# Patient Record
Sex: FEMALE | Race: WHITE | NOT HISPANIC OR LATINO | Employment: UNEMPLOYED | URBAN - METROPOLITAN AREA
[De-identification: names, ages, dates, MRNs, and addresses within clinical notes are randomized per-mention and may not be internally consistent; named-entity substitution may affect disease eponyms.]

---

## 2024-02-02 ENCOUNTER — APPOINTMENT (EMERGENCY)
Dept: CT IMAGING | Facility: HOSPITAL | Age: 49
End: 2024-02-02
Payer: COMMERCIAL

## 2024-02-02 ENCOUNTER — HOSPITAL ENCOUNTER (OUTPATIENT)
Facility: HOSPITAL | Age: 49
Setting detail: OBSERVATION
Discharge: HOME | End: 2024-02-04
Attending: EMERGENCY MEDICINE | Admitting: INTERNAL MEDICINE
Payer: COMMERCIAL

## 2024-02-02 DIAGNOSIS — R10.13 EPIGASTRIC PAIN: ICD-10-CM

## 2024-02-02 DIAGNOSIS — K21.9 GERD (GASTROESOPHAGEAL REFLUX DISEASE): ICD-10-CM

## 2024-02-02 DIAGNOSIS — K80.20 CHOLELITHIASIS WITHOUT CHOLECYSTITIS: ICD-10-CM

## 2024-02-02 DIAGNOSIS — K81.0 ACUTE CHOLECYSTITIS: ICD-10-CM

## 2024-02-02 DIAGNOSIS — R93.5 ABNORMAL CT OF THE ABDOMEN: Primary | ICD-10-CM

## 2024-02-02 LAB
2HR DELTA HS TROPONIN: 1 NG/L
ALBUMIN SERPL BCP-MCNC: 4.4 G/DL (ref 3.5–5)
ALP SERPL-CCNC: 79 U/L (ref 34–104)
ALT SERPL W P-5'-P-CCNC: 16 U/L (ref 7–52)
ANION GAP SERPL CALCULATED.3IONS-SCNC: 8 MMOL/L
AST SERPL W P-5'-P-CCNC: 14 U/L (ref 13–39)
BACTERIA UR QL AUTO: ABNORMAL /HPF
BASOPHILS # BLD AUTO: 0.05 THOUSANDS/ÂΜL (ref 0–0.1)
BASOPHILS NFR BLD AUTO: 1 % (ref 0–1)
BILIRUB SERPL-MCNC: 0.28 MG/DL (ref 0.2–1)
BILIRUB UR QL STRIP: NEGATIVE
BUN SERPL-MCNC: 9 MG/DL (ref 5–25)
CALCIUM SERPL-MCNC: 9.5 MG/DL (ref 8.4–10.2)
CARDIAC TROPONIN I PNL SERPL HS: 4 NG/L
CARDIAC TROPONIN I PNL SERPL HS: 5 NG/L
CHLORIDE SERPL-SCNC: 103 MMOL/L (ref 96–108)
CLARITY UR: CLEAR
CO2 SERPL-SCNC: 23 MMOL/L (ref 21–32)
COLOR UR: ABNORMAL
CREAT SERPL-MCNC: 0.62 MG/DL (ref 0.6–1.3)
EOSINOPHIL # BLD AUTO: 0.17 THOUSAND/ÂΜL (ref 0–0.61)
EOSINOPHIL NFR BLD AUTO: 2 % (ref 0–6)
ERYTHROCYTE [DISTWIDTH] IN BLOOD BY AUTOMATED COUNT: 17.1 % (ref 11.6–15.1)
EXT PREGNANCY TEST URINE: NEGATIVE
EXT. CONTROL: NORMAL
GFR SERPL CREATININE-BSD FRML MDRD: 106 ML/MIN/1.73SQ M
GLUCOSE SERPL-MCNC: 127 MG/DL (ref 65–140)
GLUCOSE UR STRIP-MCNC: NEGATIVE MG/DL
HCT VFR BLD AUTO: 36.5 % (ref 34.8–46.1)
HGB BLD-MCNC: 11.3 G/DL (ref 11.5–15.4)
HGB UR QL STRIP.AUTO: NEGATIVE
IMM GRANULOCYTES # BLD AUTO: 0.05 THOUSAND/UL (ref 0–0.2)
IMM GRANULOCYTES NFR BLD AUTO: 1 % (ref 0–2)
KETONES UR STRIP-MCNC: ABNORMAL MG/DL
LEUKOCYTE ESTERASE UR QL STRIP: NEGATIVE
LIPASE SERPL-CCNC: 29 U/L (ref 11–82)
LYMPHOCYTES # BLD AUTO: 2.49 THOUSANDS/ÂΜL (ref 0.6–4.47)
LYMPHOCYTES NFR BLD AUTO: 23 % (ref 14–44)
MCH RBC QN AUTO: 23.9 PG (ref 26.8–34.3)
MCHC RBC AUTO-ENTMCNC: 31 G/DL (ref 31.4–37.4)
MCV RBC AUTO: 77 FL (ref 82–98)
MONOCYTES # BLD AUTO: 0.51 THOUSAND/ÂΜL (ref 0.17–1.22)
MONOCYTES NFR BLD AUTO: 5 % (ref 4–12)
MUCOUS THREADS UR QL AUTO: ABNORMAL
NEUTROPHILS # BLD AUTO: 7.45 THOUSANDS/ÂΜL (ref 1.85–7.62)
NEUTS SEG NFR BLD AUTO: 68 % (ref 43–75)
NITRITE UR QL STRIP: NEGATIVE
NON-SQ EPI CELLS URNS QL MICRO: ABNORMAL /HPF
NRBC BLD AUTO-RTO: 0 /100 WBCS
PH UR STRIP.AUTO: 7 [PH]
PLATELET # BLD AUTO: 348 THOUSANDS/UL (ref 149–390)
PMV BLD AUTO: 9.1 FL (ref 8.9–12.7)
POTASSIUM SERPL-SCNC: 3.7 MMOL/L (ref 3.5–5.3)
PROT SERPL-MCNC: 7.4 G/DL (ref 6.4–8.4)
PROT UR STRIP-MCNC: ABNORMAL MG/DL
RBC # BLD AUTO: 4.73 MILLION/UL (ref 3.81–5.12)
RBC #/AREA URNS AUTO: ABNORMAL /HPF
SODIUM SERPL-SCNC: 134 MMOL/L (ref 135–147)
SP GR UR STRIP.AUTO: 1.03 (ref 1–1.03)
UROBILINOGEN UR STRIP-ACNC: <2 MG/DL
WBC # BLD AUTO: 10.72 THOUSAND/UL (ref 4.31–10.16)
WBC #/AREA URNS AUTO: ABNORMAL /HPF

## 2024-02-02 PROCEDURE — 83690 ASSAY OF LIPASE: CPT | Performed by: EMERGENCY MEDICINE

## 2024-02-02 PROCEDURE — 96361 HYDRATE IV INFUSION ADD-ON: CPT

## 2024-02-02 PROCEDURE — 85025 COMPLETE CBC W/AUTO DIFF WBC: CPT | Performed by: EMERGENCY MEDICINE

## 2024-02-02 PROCEDURE — 81001 URINALYSIS AUTO W/SCOPE: CPT | Performed by: EMERGENCY MEDICINE

## 2024-02-02 PROCEDURE — G1004 CDSM NDSC: HCPCS

## 2024-02-02 PROCEDURE — 74177 CT ABD & PELVIS W/CONTRAST: CPT

## 2024-02-02 PROCEDURE — 99285 EMERGENCY DEPT VISIT HI MDM: CPT | Performed by: EMERGENCY MEDICINE

## 2024-02-02 PROCEDURE — 96375 TX/PRO/DX INJ NEW DRUG ADDON: CPT

## 2024-02-02 PROCEDURE — 36415 COLL VENOUS BLD VENIPUNCTURE: CPT

## 2024-02-02 PROCEDURE — 80053 COMPREHEN METABOLIC PANEL: CPT | Performed by: EMERGENCY MEDICINE

## 2024-02-02 PROCEDURE — 99284 EMERGENCY DEPT VISIT MOD MDM: CPT

## 2024-02-02 PROCEDURE — 96374 THER/PROPH/DIAG INJ IV PUSH: CPT

## 2024-02-02 PROCEDURE — 84484 ASSAY OF TROPONIN QUANT: CPT | Performed by: EMERGENCY MEDICINE

## 2024-02-02 PROCEDURE — 81025 URINE PREGNANCY TEST: CPT | Performed by: EMERGENCY MEDICINE

## 2024-02-02 PROCEDURE — 93005 ELECTROCARDIOGRAM TRACING: CPT

## 2024-02-02 RX ORDER — ONDANSETRON 2 MG/ML
4 INJECTION INTRAMUSCULAR; INTRAVENOUS ONCE
Status: COMPLETED | OUTPATIENT
Start: 2024-02-02 | End: 2024-02-02

## 2024-02-02 RX ORDER — SUCRALFATE 1 G/1
1 TABLET ORAL ONCE
Status: COMPLETED | OUTPATIENT
Start: 2024-02-02 | End: 2024-02-02

## 2024-02-02 RX ORDER — HYDROMORPHONE HCL/PF 1 MG/ML
0.5 SYRINGE (ML) INJECTION ONCE
Status: COMPLETED | OUTPATIENT
Start: 2024-02-02 | End: 2024-02-02

## 2024-02-02 RX ADMIN — HYDROMORPHONE HYDROCHLORIDE 0.5 MG: 1 INJECTION, SOLUTION INTRAMUSCULAR; INTRAVENOUS; SUBCUTANEOUS at 23:16

## 2024-02-02 RX ADMIN — IOHEXOL 100 ML: 350 INJECTION, SOLUTION INTRAVENOUS at 23:35

## 2024-02-02 RX ADMIN — SODIUM CHLORIDE 1000 ML: 0.9 INJECTION, SOLUTION INTRAVENOUS at 23:16

## 2024-02-02 RX ADMIN — SUCRALFATE 1 G: 1 TABLET ORAL at 23:16

## 2024-02-02 RX ADMIN — ONDANSETRON 4 MG: 2 INJECTION INTRAMUSCULAR; INTRAVENOUS at 23:14

## 2024-02-03 ENCOUNTER — ANESTHESIA EVENT (OUTPATIENT)
Dept: PERIOP | Facility: HOSPITAL | Age: 49
End: 2024-02-03
Payer: COMMERCIAL

## 2024-02-03 ENCOUNTER — APPOINTMENT (OUTPATIENT)
Dept: ULTRASOUND IMAGING | Facility: HOSPITAL | Age: 49
End: 2024-02-03
Payer: COMMERCIAL

## 2024-02-03 ENCOUNTER — ANESTHESIA (OUTPATIENT)
Dept: PERIOP | Facility: HOSPITAL | Age: 49
End: 2024-02-03
Payer: COMMERCIAL

## 2024-02-03 PROBLEM — R93.5 ABNORMAL CT OF THE ABDOMEN: Status: ACTIVE | Noted: 2024-02-03

## 2024-02-03 PROBLEM — K21.9 GERD (GASTROESOPHAGEAL REFLUX DISEASE): Chronic | Status: ACTIVE | Noted: 2024-02-03

## 2024-02-03 PROBLEM — R10.13 EPIGASTRIC PAIN: Status: ACTIVE | Noted: 2024-02-03

## 2024-02-03 LAB
ATRIAL RATE: 68 BPM
P AXIS: 48 DEGREES
PR INTERVAL: 140 MS
QRS AXIS: 67 DEGREES
QRSD INTERVAL: 70 MS
QT INTERVAL: 396 MS
QTC INTERVAL: 421 MS
T WAVE AXIS: -16 DEGREES
VENTRICULAR RATE: 68 BPM

## 2024-02-03 PROCEDURE — 99223 1ST HOSP IP/OBS HIGH 75: CPT | Performed by: INTERNAL MEDICINE

## 2024-02-03 PROCEDURE — C9113 INJ PANTOPRAZOLE SODIUM, VIA: HCPCS | Performed by: PHYSICIAN ASSISTANT

## 2024-02-03 PROCEDURE — 88304 TISSUE EXAM BY PATHOLOGIST: CPT | Performed by: PATHOLOGY

## 2024-02-03 PROCEDURE — 93010 ELECTROCARDIOGRAM REPORT: CPT | Performed by: INTERNAL MEDICINE

## 2024-02-03 PROCEDURE — 99204 OFFICE O/P NEW MOD 45 MIN: CPT | Performed by: SURGERY

## 2024-02-03 PROCEDURE — 47562 LAPAROSCOPIC CHOLECYSTECTOMY: CPT | Performed by: PHYSICIAN ASSISTANT

## 2024-02-03 PROCEDURE — NC001 PR NO CHARGE: Performed by: SURGERY

## 2024-02-03 PROCEDURE — 96376 TX/PRO/DX INJ SAME DRUG ADON: CPT

## 2024-02-03 PROCEDURE — 96361 HYDRATE IV INFUSION ADD-ON: CPT

## 2024-02-03 PROCEDURE — 76705 ECHO EXAM OF ABDOMEN: CPT

## 2024-02-03 PROCEDURE — 99205 OFFICE O/P NEW HI 60 MIN: CPT | Performed by: INTERNAL MEDICINE

## 2024-02-03 PROCEDURE — 47562 LAPAROSCOPIC CHOLECYSTECTOMY: CPT | Performed by: SURGERY

## 2024-02-03 PROCEDURE — 99232 SBSQ HOSP IP/OBS MODERATE 35: CPT | Performed by: SURGERY

## 2024-02-03 RX ORDER — SODIUM CHLORIDE, SODIUM LACTATE, POTASSIUM CHLORIDE, CALCIUM CHLORIDE 600; 310; 30; 20 MG/100ML; MG/100ML; MG/100ML; MG/100ML
20 INJECTION, SOLUTION INTRAVENOUS CONTINUOUS
Status: DISCONTINUED | OUTPATIENT
Start: 2024-02-03 | End: 2024-02-03

## 2024-02-03 RX ORDER — HYDROMORPHONE HCL/PF 1 MG/ML
0.2 SYRINGE (ML) INJECTION
Status: DISCONTINUED | OUTPATIENT
Start: 2024-02-03 | End: 2024-02-03

## 2024-02-03 RX ORDER — HEPARIN SODIUM 5000 [USP'U]/ML
5000 INJECTION, SOLUTION INTRAVENOUS; SUBCUTANEOUS EVERY 8 HOURS SCHEDULED
Status: DISCONTINUED | OUTPATIENT
Start: 2024-02-03 | End: 2024-02-04 | Stop reason: HOSPADM

## 2024-02-03 RX ORDER — DEXAMETHASONE SODIUM PHOSPHATE 10 MG/ML
INJECTION, SOLUTION INTRAMUSCULAR; INTRAVENOUS AS NEEDED
Status: DISCONTINUED | OUTPATIENT
Start: 2024-02-03 | End: 2024-02-03

## 2024-02-03 RX ORDER — ACETAMINOPHEN 10 MG/ML
1000 INJECTION, SOLUTION INTRAVENOUS EVERY 8 HOURS PRN
Status: DISCONTINUED | OUTPATIENT
Start: 2024-02-03 | End: 2024-02-04 | Stop reason: HOSPADM

## 2024-02-03 RX ORDER — HYDROMORPHONE HYDROCHLORIDE 2 MG/ML
INJECTION, SOLUTION INTRAMUSCULAR; INTRAVENOUS; SUBCUTANEOUS AS NEEDED
Status: DISCONTINUED | OUTPATIENT
Start: 2024-02-03 | End: 2024-02-03

## 2024-02-03 RX ORDER — OXYCODONE HYDROCHLORIDE 5 MG/1
5 TABLET ORAL EVERY 4 HOURS PRN
Status: DISCONTINUED | OUTPATIENT
Start: 2024-02-03 | End: 2024-02-04 | Stop reason: HOSPADM

## 2024-02-03 RX ORDER — METOCLOPRAMIDE HYDROCHLORIDE 5 MG/ML
5 INJECTION INTRAMUSCULAR; INTRAVENOUS EVERY 6 HOURS PRN
Status: CANCELLED | OUTPATIENT
Start: 2024-02-03

## 2024-02-03 RX ORDER — CEFAZOLIN SODIUM 2 G/50ML
2000 SOLUTION INTRAVENOUS
Status: COMPLETED | OUTPATIENT
Start: 2024-02-04 | End: 2024-02-03

## 2024-02-03 RX ORDER — ONDANSETRON 2 MG/ML
4 INJECTION INTRAMUSCULAR; INTRAVENOUS EVERY 4 HOURS PRN
Status: DISCONTINUED | OUTPATIENT
Start: 2024-02-03 | End: 2024-02-04 | Stop reason: HOSPADM

## 2024-02-03 RX ORDER — FENTANYL CITRATE 50 UG/ML
INJECTION, SOLUTION INTRAMUSCULAR; INTRAVENOUS AS NEEDED
Status: DISCONTINUED | OUTPATIENT
Start: 2024-02-03 | End: 2024-02-03

## 2024-02-03 RX ORDER — TRAMADOL HYDROCHLORIDE 50 MG/1
50 TABLET ORAL EVERY 6 HOURS PRN
Status: DISCONTINUED | OUTPATIENT
Start: 2024-02-03 | End: 2024-02-04 | Stop reason: HOSPADM

## 2024-02-03 RX ORDER — METOCLOPRAMIDE HYDROCHLORIDE 5 MG/ML
5 INJECTION INTRAMUSCULAR; INTRAVENOUS EVERY 6 HOURS PRN
Status: DISCONTINUED | OUTPATIENT
Start: 2024-02-03 | End: 2024-02-04 | Stop reason: HOSPADM

## 2024-02-03 RX ORDER — SUCRALFATE 1 G/1
1 TABLET ORAL
Status: DISCONTINUED | OUTPATIENT
Start: 2024-02-03 | End: 2024-02-04 | Stop reason: HOSPADM

## 2024-02-03 RX ORDER — ONDANSETRON 2 MG/ML
INJECTION INTRAMUSCULAR; INTRAVENOUS AS NEEDED
Status: DISCONTINUED | OUTPATIENT
Start: 2024-02-03 | End: 2024-02-03

## 2024-02-03 RX ORDER — DICYCLOMINE HYDROCHLORIDE 10 MG/1
10 CAPSULE ORAL EVERY 6 HOURS PRN
Status: DISCONTINUED | OUTPATIENT
Start: 2024-02-03 | End: 2024-02-04 | Stop reason: HOSPADM

## 2024-02-03 RX ORDER — PANTOPRAZOLE SODIUM 40 MG/10ML
40 INJECTION, POWDER, LYOPHILIZED, FOR SOLUTION INTRAVENOUS EVERY 12 HOURS SCHEDULED
Status: CANCELLED | OUTPATIENT
Start: 2024-02-03

## 2024-02-03 RX ORDER — FENTANYL CITRATE/PF 50 MCG/ML
50 SYRINGE (ML) INJECTION
Status: DISCONTINUED | OUTPATIENT
Start: 2024-02-03 | End: 2024-02-03

## 2024-02-03 RX ORDER — MAGNESIUM HYDROXIDE 1200 MG/15ML
LIQUID ORAL AS NEEDED
Status: DISCONTINUED | OUTPATIENT
Start: 2024-02-03 | End: 2024-02-03 | Stop reason: HOSPADM

## 2024-02-03 RX ORDER — PROMETHAZINE HYDROCHLORIDE 25 MG/ML
12.5 INJECTION, SOLUTION INTRAMUSCULAR; INTRAVENOUS ONCE AS NEEDED
Status: DISCONTINUED | OUTPATIENT
Start: 2024-02-03 | End: 2024-02-03

## 2024-02-03 RX ORDER — ACETAMINOPHEN 10 MG/ML
1000 INJECTION, SOLUTION INTRAVENOUS EVERY 8 HOURS PRN
Status: DISCONTINUED | OUTPATIENT
Start: 2024-02-03 | End: 2024-02-03

## 2024-02-03 RX ORDER — EPHEDRINE SULFATE 50 MG/ML
INJECTION INTRAVENOUS AS NEEDED
Status: DISCONTINUED | OUTPATIENT
Start: 2024-02-03 | End: 2024-02-03

## 2024-02-03 RX ORDER — OMEPRAZOLE 20 MG/1
20 TABLET, DELAYED RELEASE ORAL DAILY
COMMUNITY
End: 2024-02-04

## 2024-02-03 RX ORDER — ONDANSETRON 2 MG/ML
4 INJECTION INTRAMUSCULAR; INTRAVENOUS EVERY 4 HOURS PRN
Status: CANCELLED | OUTPATIENT
Start: 2024-02-03

## 2024-02-03 RX ORDER — MAGNESIUM HYDROXIDE/ALUMINUM HYDROXICE/SIMETHICONE 120; 1200; 1200 MG/30ML; MG/30ML; MG/30ML
30 SUSPENSION ORAL EVERY 6 HOURS PRN
Status: CANCELLED | OUTPATIENT
Start: 2024-02-03

## 2024-02-03 RX ORDER — FAMOTIDINE 20 MG/1
20 TABLET, FILM COATED ORAL 2 TIMES DAILY
Status: ON HOLD | COMMUNITY
End: 2024-02-04

## 2024-02-03 RX ORDER — ONDANSETRON 2 MG/ML
4 INJECTION INTRAMUSCULAR; INTRAVENOUS ONCE AS NEEDED
Status: DISCONTINUED | OUTPATIENT
Start: 2024-02-03 | End: 2024-02-03

## 2024-02-03 RX ORDER — LIDOCAINE HYDROCHLORIDE 10 MG/ML
INJECTION, SOLUTION EPIDURAL; INFILTRATION; INTRACAUDAL; PERINEURAL AS NEEDED
Status: DISCONTINUED | OUTPATIENT
Start: 2024-02-03 | End: 2024-02-03

## 2024-02-03 RX ORDER — HEPARIN SODIUM 5000 [USP'U]/ML
5000 INJECTION, SOLUTION INTRAVENOUS; SUBCUTANEOUS EVERY 8 HOURS SCHEDULED
Status: DISCONTINUED | OUTPATIENT
Start: 2024-02-03 | End: 2024-02-03

## 2024-02-03 RX ORDER — ACETAMINOPHEN 10 MG/ML
1000 INJECTION, SOLUTION INTRAVENOUS EVERY 6 HOURS SCHEDULED
Status: CANCELLED | OUTPATIENT
Start: 2024-02-03

## 2024-02-03 RX ORDER — SUCRALFATE 1 G/1
1 TABLET ORAL
Status: CANCELLED | OUTPATIENT
Start: 2024-02-03

## 2024-02-03 RX ORDER — DICYCLOMINE HYDROCHLORIDE 10 MG/1
10 CAPSULE ORAL EVERY 6 HOURS PRN
Status: CANCELLED | OUTPATIENT
Start: 2024-02-03

## 2024-02-03 RX ORDER — MIDAZOLAM HYDROCHLORIDE 2 MG/2ML
INJECTION, SOLUTION INTRAMUSCULAR; INTRAVENOUS AS NEEDED
Status: DISCONTINUED | OUTPATIENT
Start: 2024-02-03 | End: 2024-02-03

## 2024-02-03 RX ORDER — HYDROMORPHONE HCL/PF 1 MG/ML
1 SYRINGE (ML) INJECTION ONCE
Status: COMPLETED | OUTPATIENT
Start: 2024-02-03 | End: 2024-02-03

## 2024-02-03 RX ORDER — SODIUM CHLORIDE 9 MG/ML
50 INJECTION, SOLUTION INTRAVENOUS CONTINUOUS
Status: DISCONTINUED | OUTPATIENT
Start: 2024-02-03 | End: 2024-02-03

## 2024-02-03 RX ORDER — SODIUM CHLORIDE, SODIUM LACTATE, POTASSIUM CHLORIDE, CALCIUM CHLORIDE 600; 310; 30; 20 MG/100ML; MG/100ML; MG/100ML; MG/100ML
100 INJECTION, SOLUTION INTRAVENOUS CONTINUOUS
Status: DISCONTINUED | OUTPATIENT
Start: 2024-02-03 | End: 2024-02-04 | Stop reason: HOSPADM

## 2024-02-03 RX ORDER — SODIUM CHLORIDE 9 MG/ML
50 INJECTION, SOLUTION INTRAVENOUS CONTINUOUS
Status: CANCELLED | OUTPATIENT
Start: 2024-02-03 | End: 2024-02-03

## 2024-02-03 RX ORDER — DOCUSATE SODIUM 100 MG/1
100 CAPSULE, LIQUID FILLED ORAL 2 TIMES DAILY
Status: DISCONTINUED | OUTPATIENT
Start: 2024-02-03 | End: 2024-02-04 | Stop reason: HOSPADM

## 2024-02-03 RX ORDER — SUCCINYLCHOLINE/SOD CL,ISO/PF 100 MG/5ML
SYRINGE (ML) INTRAVENOUS AS NEEDED
Status: DISCONTINUED | OUTPATIENT
Start: 2024-02-03 | End: 2024-02-03

## 2024-02-03 RX ORDER — MAGNESIUM HYDROXIDE/ALUMINUM HYDROXICE/SIMETHICONE 120; 1200; 1200 MG/30ML; MG/30ML; MG/30ML
30 SUSPENSION ORAL EVERY 6 HOURS PRN
Status: DISCONTINUED | OUTPATIENT
Start: 2024-02-03 | End: 2024-02-04 | Stop reason: HOSPADM

## 2024-02-03 RX ORDER — PANTOPRAZOLE SODIUM 40 MG/10ML
40 INJECTION, POWDER, LYOPHILIZED, FOR SOLUTION INTRAVENOUS EVERY 12 HOURS SCHEDULED
Status: DISCONTINUED | OUTPATIENT
Start: 2024-02-03 | End: 2024-02-04 | Stop reason: HOSPADM

## 2024-02-03 RX ORDER — SODIUM CHLORIDE, SODIUM LACTATE, POTASSIUM CHLORIDE, CALCIUM CHLORIDE 600; 310; 30; 20 MG/100ML; MG/100ML; MG/100ML; MG/100ML
INJECTION, SOLUTION INTRAVENOUS CONTINUOUS PRN
Status: DISCONTINUED | OUTPATIENT
Start: 2024-02-03 | End: 2024-02-03

## 2024-02-03 RX ORDER — PROPOFOL 10 MG/ML
INJECTION, EMULSION INTRAVENOUS AS NEEDED
Status: DISCONTINUED | OUTPATIENT
Start: 2024-02-03 | End: 2024-02-03

## 2024-02-03 RX ORDER — ROCURONIUM BROMIDE 10 MG/ML
INJECTION, SOLUTION INTRAVENOUS AS NEEDED
Status: DISCONTINUED | OUTPATIENT
Start: 2024-02-03 | End: 2024-02-03

## 2024-02-03 RX ORDER — KETOROLAC TROMETHAMINE 30 MG/ML
15 INJECTION, SOLUTION INTRAMUSCULAR; INTRAVENOUS EVERY 6 HOURS PRN
Status: COMPLETED | OUTPATIENT
Start: 2024-02-03 | End: 2024-02-03

## 2024-02-03 RX ORDER — MORPHINE SULFATE 4 MG/ML
4 INJECTION, SOLUTION INTRAMUSCULAR; INTRAVENOUS EVERY 4 HOURS PRN
Status: DISCONTINUED | OUTPATIENT
Start: 2024-02-03 | End: 2024-02-03

## 2024-02-03 RX ADMIN — PROPOFOL 200 MG: 10 INJECTION, EMULSION INTRAVENOUS at 14:59

## 2024-02-03 RX ADMIN — MIDAZOLAM HYDROCHLORIDE 2 MG: 1 INJECTION, SOLUTION INTRAMUSCULAR; INTRAVENOUS at 14:52

## 2024-02-03 RX ADMIN — LIDOCAINE HYDROCHLORIDE 50 MG: 10 INJECTION, SOLUTION EPIDURAL; INFILTRATION; INTRACAUDAL; PERINEURAL at 14:57

## 2024-02-03 RX ADMIN — HYDROMORPHONE HYDROCHLORIDE 0.5 MG: 2 INJECTION, SOLUTION INTRAMUSCULAR; INTRAVENOUS; SUBCUTANEOUS at 15:28

## 2024-02-03 RX ADMIN — PANTOPRAZOLE SODIUM 40 MG: 40 INJECTION, POWDER, FOR SOLUTION INTRAVENOUS at 22:07

## 2024-02-03 RX ADMIN — ONDANSETRON 4 MG: 2 INJECTION INTRAMUSCULAR; INTRAVENOUS at 15:48

## 2024-02-03 RX ADMIN — CEFAZOLIN SODIUM 2000 MG: 2 SOLUTION INTRAVENOUS at 15:01

## 2024-02-03 RX ADMIN — TRAMADOL HYDROCHLORIDE 50 MG: 50 TABLET, COATED ORAL at 22:14

## 2024-02-03 RX ADMIN — SODIUM CHLORIDE, SODIUM LACTATE, POTASSIUM CHLORIDE, AND CALCIUM CHLORIDE 100 ML/HR: .6; .31; .03; .02 INJECTION, SOLUTION INTRAVENOUS at 17:49

## 2024-02-03 RX ADMIN — SODIUM CHLORIDE, SODIUM LACTATE, POTASSIUM CHLORIDE, AND CALCIUM CHLORIDE: .6; .31; .03; .02 INJECTION, SOLUTION INTRAVENOUS at 12:30

## 2024-02-03 RX ADMIN — SUCRALFATE 1 G: 1 TABLET ORAL at 17:49

## 2024-02-03 RX ADMIN — SUGAMMADEX 200 MG: 100 INJECTION, SOLUTION INTRAVENOUS at 16:04

## 2024-02-03 RX ADMIN — SODIUM CHLORIDE, SODIUM LACTATE, POTASSIUM CHLORIDE, AND CALCIUM CHLORIDE: .6; .31; .03; .02 INJECTION, SOLUTION INTRAVENOUS at 15:53

## 2024-02-03 RX ADMIN — HYDROMORPHONE HYDROCHLORIDE 1 MG: 1 INJECTION, SOLUTION INTRAMUSCULAR; INTRAVENOUS; SUBCUTANEOUS at 01:24

## 2024-02-03 RX ADMIN — PANTOPRAZOLE SODIUM 40 MG: 40 INJECTION, POWDER, FOR SOLUTION INTRAVENOUS at 04:33

## 2024-02-03 RX ADMIN — Medication 100 MG: at 14:59

## 2024-02-03 RX ADMIN — DICYCLOMINE HYDROCHLORIDE 10 MG: 10 CAPSULE ORAL at 10:43

## 2024-02-03 RX ADMIN — SUCRALFATE 1 G: 1 TABLET ORAL at 10:25

## 2024-02-03 RX ADMIN — ROCURONIUM BROMIDE 30 MG: 10 INJECTION, SOLUTION INTRAVENOUS at 15:04

## 2024-02-03 RX ADMIN — SUCRALFATE 1 G: 1 TABLET ORAL at 22:04

## 2024-02-03 RX ADMIN — SUCRALFATE 1 G: 1 TABLET ORAL at 06:14

## 2024-02-03 RX ADMIN — KETOROLAC TROMETHAMINE 15 MG: 30 INJECTION, SOLUTION INTRAMUSCULAR; INTRAVENOUS at 07:48

## 2024-02-03 RX ADMIN — SODIUM CHLORIDE 50 ML/HR: 0.9 INJECTION, SOLUTION INTRAVENOUS at 04:33

## 2024-02-03 RX ADMIN — FENTANYL CITRATE 100 MCG: 50 INJECTION, SOLUTION INTRAMUSCULAR; INTRAVENOUS at 14:57

## 2024-02-03 RX ADMIN — ACETAMINOPHEN 1000 MG: 10 INJECTION INTRAVENOUS at 10:43

## 2024-02-03 RX ADMIN — DOCUSATE SODIUM 100 MG: 100 CAPSULE, LIQUID FILLED ORAL at 17:49

## 2024-02-03 RX ADMIN — EPHEDRINE SULFATE 10 MG: 50 INJECTION, SOLUTION INTRAVENOUS at 15:19

## 2024-02-03 RX ADMIN — DEXAMETHASONE SODIUM PHOSPHATE 10 MG: 10 INJECTION INTRAMUSCULAR; INTRAVENOUS at 15:01

## 2024-02-03 RX ADMIN — ROCURONIUM BROMIDE 20 MG: 10 INJECTION, SOLUTION INTRAVENOUS at 15:26

## 2024-02-03 NOTE — PROGRESS NOTES
Vascular surgery telephone communication    Contacted regarding Ms. Niyah Leong in regards to her splenic artery aneurysm.    In summary of chart review, patient is a 48-year-old female with minimal past medical history including a PMH of GERD presenting for abdominal pain.    Discussed patient with admitting internal medicine team.  Per report, patient reports epigastric burning like pain has been present for approximately a week.  It is similar to prior heartburn-like episodes however has been more persistent.  There is some reports of back pain and left upper quadrant pain beneath her ribs.    She is hemodynamically stable and abdominal exam benign without any focal tenderness or back pain.    On review of imaging, she has a CT abdomen pelvis with IV contrast.  There is no evidence of hemoperitoneum or other fluid collections in the left upper quadrant or otherwise in the abdomen.  There is cholelithiasis without inflammation or fluid in the right upper quadrant.    There is an approximately 1.5 cm x 2 cm circumferentially calcified splenic artery aneurysm in the hilum of her splenic artery that appears to be coming off a secondary branch.  There is no associated hematoma, inflammation, or contrast extravasation.    At this time given clinical history with prior heartburn-like symptoms would pursue management of her acid reflux with observation for resolution of symptoms.  If persistent abdominal pain agree with GI consult to thoroughly evaluate whether the symptoms are related to her GERD or less likely related to her splenic artery aneurysm  Can evaluate right upper quadrant with a RUQ ultrasound to eval for any signs of acute cholecystitis  Recommend smoking cessation  Recommend antiplatelet therapy with aspirin 81 mg, and cholesterol therapy with high intensity statin.  If any change in hemodynamic status, abdominal pain then please not hesitate to contact vascular surgery with any concerns or  questions  No need for transfer at this time  Recommendations repair of visceral artery aneurysms include any size in childbearing age females and greater than 3cm and non-childbearing age females.  Patient should have close follow-up with vascular surgery

## 2024-02-03 NOTE — H&P
[facility]  H&P  Name: Niyah Hall 48 y.o. female I MRN: [id number]  Unit/Bed#: FT 02 I Date of Admission: 2/2/2024   Date of Service: 2/3/2024 I Hospital Day: 0      Assessment/Plan   * Epigastric pain  Assessment & Plan  Patient reports pain radiates to back at times, persistent which is new from her typical acid reflux pain  CT abdomen pelvis revealing cholelithiasis but no acute cholecystitis  Unable to tolerate oral, for now will start clear liquid diet, advance as tolerated, gentle IV fluid hydration  Right upper quadrant ultrasound due to cholelithiasis  As needed antiemetics, medication for pain  GI consult  IV Protonix  Patient denies chest pain, troponin x 2 negative, EKG revealing normal sinus rhythm with sinus arrhythmia    Abnormal CT of the abdomen  Assessment & Plan  Incidental finding of a simple cyst in left retroperitoneal region, indeterminate  Recommendation to follow-up in 3 months for CT abdomen pelvis for monitoring  Also incidental finding of a chronic peripherally calcified and partially thrombosed splenic artery aneurysm measuring up to 2.5 cm  Due to being partially thrombosed did reach out to on-call vascular surgery who reports anticoagulation not needed at this time as this could be a normal finding, however, due to patient being within childbearing age and aneurysm is 2.5 cm or greater her aneurysm does warrant repair most likely electively  Reviewed finding with patient and recommendation to follow-up outpatient after discharge with vascular surgery  Vascular surgery also reports at this time patient does not need to be transferred to [facility], but they will monitor patient peripherally         VTE Pharmacologic Prophylaxis: VTE Score: 2 Low Risk (Score 0-2) - Encourage Ambulation.  Code Status: Level 1 - Full Code   Discussion with family:  pt.     Anticipated Length of Stay: Patient will be admitted on an observation basis with an anticipated length  of stay of less than 2 midnights secondary to see above.    Total Time Spent on Date of Encounter in care of patient: 70 mins. This time was spent on one or more of the following: performing physical exam; counseling and coordination of care; obtaining or reviewing history; documenting in the medical record; reviewing/ordering tests, medications or procedures; communicating with other healthcare professionals and discussing with patient's family/caregivers.    Chief Complaint:    Chief Complaint   Patient presents with    Abdominal Pain     Patient reports abdominal pain and left back pain for about a week, seen at urgent care for same and prescribed meds but not helping.        History of Present Illness:  Niyah Hall is a 48 y.o. female with a PMH of GERD who presents with complaint of sudden onset moderate to severe constant burning/aching epigastric pain with rx to LUQ that occasionally does radiate to her back starting about a week ago.  Patient reports she has had pain like this in the past related to acid reflux.  However, she reports usually when she has had episodes like this in the past they were very brief and the pain goes away if she changes up her diet.  However, she reports she did change up her diet but the pain has been very persistent and more constant this week to the point that she has not been eating over the last day.  She does admit to some nausea.  No vomiting.  She reports after receiving IV Dilaudid in the ED the pain has subsided somewhat.  She reports she mostly feels in her epigastric region burning sensation.  Denies chest pain.  Reports she went to urgent care on Monday for the pain and was prescribed Pepcid, but she reports it has not been helpful     Review of Systems:  Review of Systems   Constitutional:  Positive for appetite change.   Respiratory:  Negative for shortness of breath.    Cardiovascular:  Negative for chest pain.   Gastrointestinal:  Positive for abdominal pain  and nausea. Negative for diarrhea and vomiting.   Neurological:  Negative for headaches.       Past Medical and Surgical History:   Past Medical History:   Diagnosis Date    Acid reflux        Past Surgical History:   Procedure Laterality Date     SECTION         Meds/Allergies:  Prior to Admission medications    Medication Sig Start Date End Date Taking? Authorizing Provider   famotidine (PEPCID) 20 mg tablet Take 20 mg by mouth 2 (two) times a day   Yes Historical Provider, MD   omeprazole (PriLOSEC OTC) 20 MG tablet Take 20 mg by mouth daily   Yes Historical Provider, MD     I have reviewed her medications per review of chart and partially with patient.     Allergies: No Known Allergies    Social History:  Marital Status: /Civil Union     Patient Pre-hospital Living Situation: Home  Patient Pre-hospital Level of Mobility: walks  Patient Pre-hospital Diet Restrictions: n/a  Substance Use History:   Social History     Substance and Sexual Activity   Alcohol Use Never     Social History     Tobacco Use   Smoking Status Never   Smokeless Tobacco Never     Social History     Substance and Sexual Activity   Drug Use Never       Family History:  History reviewed. No pertinent family history.    Physical Exam:     Vitals:   Blood Pressure: 156/80 (24 0130)  Pulse: 82 (24)  Temperature: 97.8 °F (36.6 °C) (24)  Temp Source: Temporal (24)  Respirations: 19 (24)  SpO2: 97 % (24)    Physical Exam  Vitals and nursing note reviewed.   Constitutional:       General: She is not in acute distress.     Appearance: She is not toxic-appearing.   HENT:      Head: Normocephalic.   Cardiovascular:      Rate and Rhythm: Normal rate and regular rhythm.   Pulmonary:      Effort: Pulmonary effort is normal. No respiratory distress.      Breath sounds: Normal breath sounds.   Abdominal:      General: Abdomen is flat. Bowel sounds are decreased. There is no  distension.      Palpations: Abdomen is soft.      Tenderness: There is abdominal tenderness in the epigastric area and periumbilical area. There is no guarding or rebound.   Musculoskeletal:      Right lower leg: No edema.      Left lower leg: No edema.   Skin:     General: Skin is warm and dry.   Neurological:      General: No focal deficit present.      Mental Status: She is alert and oriented to person, place, and time.   Psychiatric:         Mood and Affect: Mood normal.         Behavior: Behavior normal.         Thought Content: Thought content normal.          Additional Data:     Lab Results:  Results from last 7 days   Lab Units 02/02/24  2131   WBC Thousand/uL 10.72*   HEMOGLOBIN g/dL 11.3*   HEMATOCRIT % 36.5   PLATELETS Thousands/uL 348   NEUTROS PCT % 68   LYMPHS PCT % 23   MONOS PCT % 5   EOS PCT % 2     Results from last 7 days   Lab Units 02/02/24  2131   SODIUM mmol/L 134*   POTASSIUM mmol/L 3.7   CHLORIDE mmol/L 103   CO2 mmol/L 23   BUN mg/dL 9   CREATININE mg/dL 0.62   ANION GAP mmol/L 8   CALCIUM mg/dL 9.5   ALBUMIN g/dL 4.4   TOTAL BILIRUBIN mg/dL 0.28   ALK PHOS U/L 79   ALT U/L 16   AST U/L 14   GLUCOSE RANDOM mg/dL 127                       Lines/Drains:  Invasive Devices       Peripheral Intravenous Line  Duration             Peripheral IV 02/02/24 Right;Ventral (anterior) Forearm <1 day                        Imaging: Reviewed radiology reports from this admission including: abdominal/pelvic CT  CT abdomen pelvis with contrast   Final Result by Vitaliy Watson DO (02/03 0050)   Cholelithiasis. No CT evidence of acute cholecystitis.   Chronic appearing peripherally calcified splenic artery aneurysm measuring up to 2.5 cm. Nonemergent vascular/interventional radiologic consultation is advised.   Simple cyst in the left retroperitoneal region measuring up to 2.9 cm, indeterminate. Differential includes mesenteric cyst. Short-term follow-up 3-month contrast-enhanced CT abdomen pelvis is  "recommended to ensure stability.   This study was marked for \"Immediate\" notification in EPIC.         Workstation performed: [id number]             EKG and Other Studies Reviewed on Admission:   EKG:  Normal sinus rhythm with sinus arrhythmia.    ** Please Note: This note has been constructed using a voice recognition system. **      "

## 2024-02-03 NOTE — ASSESSMENT & PLAN NOTE
Patient presented with right upper quadrant pain finding and imaging suggesting of acute cholecystitis.  Treated with IV fluid, pain management, NPO.  S/p cholecystectomy by general surgery.  Tolerating procedure well.  Denies any complaints after that.  Pain is very minimal and controlled with current medication.    GI consulted recommended outpatient EGD for persistent reflux symptoms  Continue Pepcid as needed  Follow-up with general surgery in [place]  Pain management with Tylenol  Prescription for short course of oxycodone given

## 2024-02-03 NOTE — CONSULTS
Consultation - General Surgery  Niyah Hall 48 y.o. female MRN: [id number]  Unit/Bed#: -01 Encounter: [id number]                                                  Inpatient consult to Acute Care Surgery  Consult performed by: Adriana Quiroz PA-C  Consult ordered by: Israel Brown MD          Assessment   Niyah Hall is a 48 y.o. year old female with acute cholecystitis and cholelithiasis  Splenic artery aneurysm    Presented with back pain located on the left side with reports of increasing GERD symptoms as well.  Imaging findings of CT/ultrasound as well as clinical exam f consistent with acute cholecystitis.  She does have approximately 2 cm stone which is nonmobile with marked diffuse wall thickening of the gallbladder.  She is tender in the right upper quadrant with a positive Cruz sign.White blood cell count 10.7 on admission. She did have finding of splenic artery aneurysm, which is being managed by Vascular and IM.    Plan  Plan for OR today for laparoscopic cholecystectomy, possible open. This was discussed with the patient who is agreeable with the plan. Informed consent will be obtained by the surgeon  NPO/IVF for OR today, ok for diet post-operatively.  Afebrile, vital signs stable.    IV ancef for microbial coverage  Agree with PPI for GI  Trend labs, monitor WBC and liver functions  Monitor vitals  Analgesia and antiemetics, PRN  Serial abdominal exams  IS post-operatively  Ambulation/OOB post-operatively  DVT prophylaxis    ______________________________________________________________________    CHIEF COMPLAINT: Reflux and left-sided back pain    HPI: Niyah Hall is a 48 y.o. year old female with PMHx of GERD who presented to the emergency department yesterday with acid reflux increasing in severity and pain in her left back.  She has had chronic GI issues since her last pregnancy about 6 years ago.  Her symptoms are helps with changes in diet such as avoiding fatty  greasy foods and acidic foods.  She has been able to manage symptoms with these diet changes.  However, over the past week she has noticed more persistent abdominal pain and acid reflux type symptoms with burning in her abdomen going into her throat even with bland foods.  She does take Protonix at home for her symptoms which has not worked as well recently.  Her only abdominal surgical history is a  section 6 years ago.      Review of Systems   Constitutional:  Positive for appetite change. Negative for activity change, chills and fever.   HENT: Negative.     Eyes: Negative.    Respiratory: Negative.     Cardiovascular: Negative.    Gastrointestinal:  Positive for abdominal pain. Negative for blood in stool, constipation, diarrhea, nausea and vomiting.   Genitourinary:  Negative for difficulty urinating, dysuria and frequency.   Musculoskeletal:  Positive for back pain.   Skin: Negative.  Negative for color change.   Allergic/Immunologic: Negative.    Neurological: Negative.  Negative for dizziness, light-headedness and headaches.   Hematological: Negative.    Psychiatric/Behavioral: Negative.  Negative for agitation and confusion.    All other systems reviewed and are negative.    Meds/Allergies   No Known Allergies   all current active meds have been reviewed       Historical Information   Past Medical History:   Diagnosis Date    Acid reflux      Past Surgical History:   Procedure Laterality Date     SECTION       Social History   Social History     Substance and Sexual Activity   Alcohol Use Never     Social History     Substance and Sexual Activity   Drug Use Never     Social History     Tobacco Use   Smoking Status Never   Smokeless Tobacco Never       Family History: non-contributory      Objective   Lab Results:   Recent Results (from the past 36 hour(s))   ECG 12 lead    Collection Time: 24  9:30 PM   Result Value Ref Range    Ventricular Rate 68 BPM    Atrial Rate 68 BPM    WY  Interval 140 ms    QRSD Interval 70 ms    QT Interval 396 ms    QTC Interval 421 ms    P Axis 48 degrees    QRS Axis 67 degrees    T Wave Axis -16 degrees   CBC and differential    Collection Time: 02/02/24  9:31 PM   Result Value Ref Range    WBC 10.72 (H) 4.31 - 10.16 Thousand/uL    RBC 4.73 3.81 - 5.12 Million/uL    Hemoglobin 11.3 (L) 11.5 - 15.4 g/dL    Hematocrit 36.5 34.8 - 46.1 %    MCV 77 (L) 82 - 98 fL    MCH 23.9 (L) 26.8 - 34.3 pg    MCHC 31.0 (L) 31.4 - 37.4 g/dL    RDW 17.1 (H) 11.6 - 15.1 %    MPV 9.1 8.9 - 12.7 fL    Platelets 348 149 - 390 Thousands/uL    nRBC 0 /100 WBCs    Neutrophils Relative 68 43 - 75 %    Immat GRANS % 1 0 - 2 %    Lymphocytes Relative 23 14 - 44 %    Monocytes Relative 5 4 - 12 %    Eosinophils Relative 2 0 - 6 %    Basophils Relative 1 0 - 1 %    Neutrophils Absolute 7.45 1.85 - 7.62 Thousands/µL    Immature Grans Absolute 0.05 0.00 - 0.20 Thousand/uL    Lymphocytes Absolute 2.49 0.60 - 4.47 Thousands/µL    Monocytes Absolute 0.51 0.17 - 1.22 Thousand/µL    Eosinophils Absolute 0.17 0.00 - 0.61 Thousand/µL    Basophils Absolute 0.05 0.00 - 0.10 Thousands/µL   Comprehensive metabolic panel    Collection Time: 02/02/24  9:31 PM   Result Value Ref Range    Sodium 134 (L) 135 - 147 mmol/L    Potassium 3.7 3.5 - 5.3 mmol/L    Chloride 103 96 - 108 mmol/L    CO2 23 21 - 32 mmol/L    ANION GAP 8 mmol/L    BUN 9 5 - 25 mg/dL    Creatinine 0.62 0.60 - 1.30 mg/dL    Glucose 127 65 - 140 mg/dL    Calcium 9.5 8.4 - 10.2 mg/dL    AST 14 13 - 39 U/L    ALT 16 7 - 52 U/L    Alkaline Phosphatase 79 34 - 104 U/L    Total Protein 7.4 6.4 - 8.4 g/dL    Albumin 4.4 3.5 - 5.0 g/dL    Total Bilirubin 0.28 0.20 - 1.00 mg/dL    eGFR 106 ml/min/1.73sq m   Lipase    Collection Time: 02/02/24  9:31 PM   Result Value Ref Range    Lipase 29 11 - 82 u/L   UA w Reflex to Microscopic w Reflex to Culture    Collection Time: 02/02/24  9:31 PM    Specimen: Urine, Clean Catch   Result Value Ref Range    Color,  "UA Light Yellow     Clarity, UA Clear     Specific Gravity, UA 1.027 1.003 - 1.030    pH, UA 7.0 4.5, 5.0, 5.5, 6.0, 6.5, 7.0, 7.5, 8.0    Leukocytes, UA Negative Negative    Nitrite, UA Negative Negative    Protein, UA Trace (A) Negative mg/dl    Glucose, UA Negative Negative mg/dl    Ketones, UA Trace (A) Negative mg/dl    Urobilinogen, UA <2.0 <2.0 mg/dl mg/dl    Bilirubin, UA Negative Negative    Occult Blood, UA Negative Negative   HS Troponin 0hr (reflex protocol)    Collection Time: 02/02/24  9:31 PM   Result Value Ref Range    hs TnI 0hr 4 \"Refer to ACS Flowchart\"- see link ng/L   Urine Microscopic    Collection Time: 02/02/24  9:31 PM   Result Value Ref Range    RBC, UA 1-2 None Seen, 1-2 /hpf    WBC, UA None Seen None Seen, 1-2 /hpf    Epithelial Cells Occasional None Seen, Occasional /hpf    Bacteria, UA None Seen None Seen, Occasional /hpf    MUCUS THREADS Occasional (A) None Seen   POCT pregnancy, urine    Collection Time: 02/02/24 10:59 PM   Result Value Ref Range    EXT Preg Test, Ur Negative     Control Valid    HS Troponin I 2hr    Collection Time: 02/02/24 11:20 PM   Result Value Ref Range    hs TnI 2hr 5 \"Refer to ACS Flowchart\"- see link ng/L    Delta 2hr hsTnI 1 <20 ng/L       No intake or output data in the 24 hours ending 02/03/24 1203  Invasive Devices       Peripheral Intravenous Line  Duration             Peripheral IV 02/02/24 Right;Ventral (anterior) Forearm <1 day                    Physical Exam  Vitals: /60   Pulse 79   Temp 97.8 °F (36.6 °C)   Resp 16   Ht 5' 4\" (1.626 m)   Wt 102 kg (223 lb 15.8 oz)   LMP 01/26/2024 (Exact Date)   SpO2 94%   BMI 38.45 kg/m²   GEN: A & O x 3, cooperative, No acute distress, appears stated age  HEENT: pupils equal and round, EOMI, sclera anicterus, oral mucosa pink/moist  NECK: supple  LUNGS: clear throughout, no wheezes or rhonchi  COR: RRR, S1 and S2 normal  ABD: +BS, soft, nondistend, lateral RUQ TTP with guarding, no rigidity, " "+Cruz's sign  EXTREM: FROM no joint deformities.  Mild BLE edema, +pulses  SKIN: warm, dry, no rash, no jaundice  NEURO: CN II -XII intact, no tremor, affect appropriate    Imaging Studies:   US right upper quadrant    Result Date: 2/3/2024  Impression: Sonographic findings detailed above suspicious for acute cholecystitis. Correlate with clinical parameters. The study was marked in EPIC for immediate notification. Tiger text communication sent to and acknowledged by gastroenterology consult Noemy Purvis and medical resident Charlie Workstation performed: [id number]     CT abdomen pelvis with contrast    Result Date: 2/3/2024  Impression: Cholelithiasis. No CT evidence of acute cholecystitis. Chronic appearing peripherally calcified splenic artery aneurysm measuring up to 2.5 cm. Nonemergent vascular/interventional radiologic consultation is advised. Simple cyst in the left retroperitoneal region measuring up to 2.9 cm, indeterminate. Differential includes mesenteric cyst. Short-term follow-up 3-month contrast-enhanced CT abdomen pelvis is recommended to ensure stability. This study was marked for \"Immediate\" notification in EPIC. Workstation performed: [id number]         Adriana Quiroz PA-C  2/3/2024  "

## 2024-02-03 NOTE — DISCHARGE INSTR - AVS FIRST PAGE
Surgery Discharge instructions  Follow up:  General: Please make sure you are scheduled for 2-week post op appointment. If you are not, please call the office to set up a post operative appointment to be seen in 2 weeks: [phone]  You may also chose to follow up with a surgeon when you live or your PCP if they are comfortable seeing you.  You should follow up with your PCP in the next 1-2 weeks for a return to care, post hospitalization visit    Wound care:    Bandage/Dressing -Make sure to remove the bandage in 48 hours, unless instructed otherwise by your surgeon. The steri-strips will fall off on their own. You may remove the steri strips 1 week from your surgery, if they are still in place. You may redress the incisions.   Do not apply any creams, lotions, or ointments.  Keep the incision clean and dry. You may shower starting the day after surgery (no baths, hot tubs, or swimming until you are cleared in the office for your post op). It is OK to allow soapy water to run over incisions, then rinse and pat dry.  DO NOT SCRUB.  Please contact your surgeon if your incisions have redness, extreme tenderness, or signs of infections (Pain, swelling redness, odor or green/yellow discharge around incisions)  You should also contact your surgeon if the wound has persistent, active bleeding.     3. Activity:   As tolerated, no strenuous activity for the first 2-4 weeks (unless advised differently by a provider).  Please take it easy for the first few days.    If you have had abdominal surgery, do not lift anything heavier than 10 pounds for at least 6 weeks.   We encourage you to use the provided incentive spirometer.   If you develop gas pain, ambulation (walking) will help pass the gas from anesthesia.  If you have severe gas pain, you may take GAS-X.   Gradually increase your activity daily. Walking 3-4 times daily is good and stairs are ok.  Listen to your body.  If you start to get tired or sore then rest.   No  heavy lifting, pushing or pulling.   No driving for 5 days or while taking narcotics for pain.     4. Diet:   Resume your normal diet unless specified otherwise.    We recommend you slowly advance your diet. Try to start with softer bland foods (soup, crackers, etc.) and gradually advance as tolerated.     5. Medications:   Resume all your previous medications, unless told otherwise by the doctor.   If taking narcotic pain medication, do not take on an empty stomach.  Tylenol is ok to use for pain, unless you are taking any narcotic pain medication containing Tylenol (such as Percocet, Vicodin, or anything containing acetaminophen). Do not take Tylenol if you're taking these medications. Take one or the other. Do not exceed more than 4000 mg of acetaminophen in 24 hours or 3000 mg if you have liver disease.   You may also take ibuprofen for pain.  If you become constipated, you may take a stool softener (colace). Take the stool softener while you are taking narcotic pain medication to help prevent constipation. If you continue to have constipation you may take Miralax or Milk of Magnesium.  All of these remedies are over the counter.   If you were given an antibiotic take it until it is finished.     6. Driving:   You will need a  home from the hospital. Do not drive or make any important decisions while on narcotic pain medication or 24 hours after surgery. No driving for 5 days or while taking narcotic pain medication    7. Constipation:   Patients often experience constipation after surgery.  You may take a stool softener (Colace) to help prevent constipation.    If you experience significant nausea or vomiting after abdominal surgery, call the office before trying any stronger medications or laxatives  Call the office if you haven't had a Bowel movement in 2-3days after surgery    8. Call the office: If you are experiencing any of the following, A provider is on call 24 hours a day:  You have a fever over  100.5°F or chills.    You have pain or nausea that is not relieved by medicine.    You have redness and swelling around your incisions, or blood or pus is leaking from your incisions.    You are constipated or have diarrhea.    Your skin or eyes are yellow, or your bowel movements are pale.    You have questions or concerns about your surgery, condition, or care.  Seek care immediately or call 911 if:   You cannot stop vomiting.    Your bowel movements are black or bloody.    You have pain in your abdomen and it is swollen or hard.    Your arm or leg feels warm, tender, and painful. It may look swollen and red.   You feel lightheaded, short of breath, and have chest pain.    You cough up blood.    Instructions from medicine team:-    Follow-up with your primary doctor as soon as possible regarding your recent hospitalization.    Follow-up with your vascular surgery after getting referral from pulmonary doctor.    Continue taking aspirin and cholesterol medication until you are seen by a vascular doctor.    Follow-up with your general surgeon regarding your recent surgery.    Take Pepcid as needed only up to 2 times a day for your gastric reflux if you get and avoid Prilosec.

## 2024-02-03 NOTE — ASSESSMENT & PLAN NOTE
Patient reports pain radiates to back at times, persistent which is new from her typical acid reflux pain  CT abdomen pelvis revealing cholelithiasis but no acute cholecystitis  Unable to tolerate oral, for now will start clear liquid diet, advance as tolerated, gentle IV fluid hydration  As needed antiemetics, medication for pain  If pain still persisting consider GI consult  IV Protonix  Patient denies chest pain, troponin x 2 negative, EKG revealing normal sinus rhythm with sinus arrhythmia

## 2024-02-03 NOTE — ANESTHESIA PREPROCEDURE EVALUATION
Procedure:  CHOLECYSTECTOMY LAPAROSCOPIC, possible open (Abdomen)    Relevant Problems   ANESTHESIA  No prior anesthetics. Denies family hx of anes comp      CARDIO (within normal limits)      ENDO (within normal limits)      GI/HEPATIC   (+) GERD (gastroesophageal reflux disease)      /RENAL (within normal limits)      GYN   (-) Currently pregnant      HEMATOLOGY (within normal limits)      MUSCULOSKELETAL (within normal limits)      NEURO/PSYCH (within normal limits)      PULMONARY (within normal limits)   (-) Smoking   (-) URI (upper respiratory infection)      Other   (+) BMI 38.0-38.9,adult        Physical Exam    Airway    Mallampati score: II  TM Distance: >3 FB  Neck ROM: full     Dental   No notable dental hx     Cardiovascular  Rhythm: regular, Rate: normal    Pulmonary   Breath sounds clear to auscultation    Other Findings  post-pubertal.      Anesthesia Plan  ASA Score- 2     Anesthesia Type- general with ASA Monitors.         Additional Monitors:     Airway Plan: ETT.           Plan Factors-Exercise tolerance (METS): >4 METS.    Chart reviewed. EKG reviewed.  Existing labs reviewed.     Patient is not a current smoker.              Induction- intravenous.    Postoperative Plan- Plan for postoperative opioid use. Planned trial extubation    Informed Consent- Anesthetic plan and risks discussed with patient.  I personally reviewed this patient with the CRNA. Discussed and agreed on the Anesthesia Plan with the CRNA..            Lab Results   Component Value Date    WBC 10.72 (H) 02/02/2024    HGB 11.3 (L) 02/02/2024    HCT 36.5 02/02/2024    MCV 77 (L) 02/02/2024     02/02/2024     Lab Results   Component Value Date    SODIUM 134 (L) 02/02/2024    K 3.7 02/02/2024     02/02/2024    CO2 23 02/02/2024    BUN 9 02/02/2024    CREATININE 0.62 02/02/2024    GLUC 127 02/02/2024    CALCIUM 9.5 02/02/2024     Lab Results   Component Value Date    ALT 16 02/02/2024    AST 14 02/02/2024    ALKPHOS 79  "02/02/2024     No results found for: \"INR\", \"PROTIME\"  No results found for: \"HGBA1C\"      "

## 2024-02-03 NOTE — ASSESSMENT & PLAN NOTE
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

## 2024-02-03 NOTE — PLAN OF CARE
Problem: PAIN - ADULT  Goal: Verbalizes/displays adequate comfort level or baseline comfort level  Description: Interventions:  - Encourage patient to monitor pain and request assistance  - Assess pain using appropriate pain scale  - Administer analgesics based on type and severity of pain and evaluate response  - Implement non-pharmacological measures as appropriate and evaluate response  - Consider cultural and social influences on pain and pain management  - Notify physician/advanced practitioner if interventions unsuccessful or patient reports new pain  Outcome: Progressing     Problem: INFECTION - ADULT  Goal: Absence or prevention of progression during hospitalization  Description: INTERVENTIONS:  - Assess and monitor for signs and symptoms of infection  - Monitor lab/diagnostic results  - Monitor all insertion sites, i.e. indwelling lines, tubes, and drains  - Monitor endotracheal if appropriate and nasal secretions for changes in amount and color  - [place] appropriate cooling/warming therapies per order  - Administer medications as ordered  - Instruct and encourage patient and family to use good hand hygiene technique  - Identify and instruct in appropriate isolation precautions for identified infection/condition  Outcome: Progressing  Goal: Absence of fever/infection during neutropenic period  Description: INTERVENTIONS:  - Monitor WBC    Outcome: Progressing     Problem: SAFETY ADULT  Goal: Patient will remain free of falls  Description: INTERVENTIONS:  - Educate patient/family on patient safety including physical limitations  - Instruct patient to call for assistance with activity   - Consult OT/PT to assist with strengthening/mobility   - Keep Call bell within reach  - Keep bed low and locked with side rails adjusted as appropriate  - Keep care items and personal belongings within reach  - Initiate and maintain comfort rounds  - Make Fall Risk Sign visible to staff  - Offer Toileting every 2 Hours,  in advance of need  - Initiate/Maintain bed alarm  - Obtain necessary fall risk management equipment: chair alarm  - Apply yellow socks and bracelet for high fall risk patients  - Consider moving patient to room near nurses station  Outcome: Progressing  Goal: Maintain or return to baseline ADL function  Description: INTERVENTIONS:  -  Assess patient's ability to carry out ADLs; assess patient's baseline for ADL function and identify physical deficits which impact ability to perform ADLs (bathing, care of mouth/teeth, toileting, grooming, dressing, etc.)  - Assess/evaluate cause of self-care deficits   - Assess range of motion  - Assess patient's mobility; develop plan if impaired  - Assess patient's need for assistive devices and provide as appropriate  - Encourage maximum independence but intervene and supervise when necessary  - Involve family in performance of ADLs  - Assess for home care needs following discharge   - Consider OT consult to assist with ADL evaluation and planning for discharge  - Provide patient education as appropriate  Outcome: Progressing  Goal: Maintains/Returns to pre admission functional level  Description: INTERVENTIONS:  - Perform AM-PAC 6 Click Basic Mobility/ Daily Activity assessment daily.  - Set and communicate daily mobility goal to care team and patient/family/caregiver.   - Collaborate with rehabilitation services on mobility goals if consulted  - Perform Range of Motion 3 times a day.  - Reposition patient every 3 hours.  - Dangle patient 3 times a day  - Stand patient 3 times a day  - Ambulate patient 3 times a day  - Out of bed to chair 3 times a day   - Out of bed for meals 3 times a day  - Out of bed for toileting  - Record patient progress and toleration of activity level   Outcome: Progressing     Problem: DISCHARGE PLANNING  Goal: Discharge to home or other facility with appropriate resources  Description: INTERVENTIONS:  - Identify barriers to discharge w/patient and  caregiver  - Arrange for needed discharge resources and transportation as appropriate  - Identify discharge learning needs (meds, wound care, etc.)  - Arrange for interpretive services to assist at discharge as needed  - Refer to Case Management Department for coordinating discharge planning if the patient needs post-hospital services based on physician/advanced practitioner order or complex needs related to functional status, cognitive ability, or social support system  Outcome: Progressing     Problem: Knowledge Deficit  Goal: Patient/family/caregiver demonstrates understanding of disease process, treatment plan, medications, and discharge instructions  Description: Complete learning assessment and assess knowledge base.  Interventions:  - Provide teaching at level of understanding  - Provide teaching via preferred learning methods  Outcome: Progressing     Problem: GASTROINTESTINAL - ADULT  Goal: Minimal or absence of nausea and/or vomiting  Description: INTERVENTIONS:  - Administer IV fluids if ordered to ensure adequate hydration  - Maintain NPO status until nausea and vomiting are resolved  - Nasogastric tube if ordered  - Administer ordered antiemetic medications as needed  - Provide nonpharmacologic comfort measures as appropriate  - Advance diet as tolerated, if ordered  - Consider nutrition services referral to assist patient with adequate nutrition and appropriate food choices  Outcome: Progressing  Goal: Maintains or returns to baseline bowel function  Description: INTERVENTIONS:  - Assess bowel function  - Encourage oral fluids to ensure adequate hydration  - Administer IV fluids if ordered to ensure adequate hydration  - Administer ordered medications as needed  - Encourage mobilization and activity  - Consider nutritional services referral to assist patient with adequate nutrition and appropriate food choices  Outcome: Progressing  Goal: Maintains adequate nutritional intake  Description:  INTERVENTIONS:  - Monitor percentage of each meal consumed  - Identify factors contributing to decreased intake, treat as appropriate  - Assist with meals as needed  - Monitor I&O, weight, and lab values if indicated  - Obtain nutrition services referral as needed  Outcome: Progressing     Problem: METABOLIC, FLUID AND ELECTROLYTES - ADULT  Goal: Fluid balance maintained  Description: INTERVENTIONS:  - Monitor labs   - Monitor I/O and WT  - Instruct patient on fluid and nutrition as appropriate  - Assess for signs & symptoms of volume excess or deficit  Outcome: Progressing

## 2024-02-03 NOTE — ED PROVIDER NOTES
History  Chief Complaint   Patient presents with    Abdominal Pain     Patient reports abdominal pain and left back pain for about a week, seen at urgent care for same and prescribed meds but not helping.       History provided by:  Patient   used: No      48-year-old female presented for evaluation of worsening abdominal pain over the last 1 to 2 weeks.  It initially was feeling like an exacerbation of acid reflux with epigastric/burning sensation worsening with eating.  States that over the last few days it has been more consistent, more severe and not related to eating.  She is also having pain in the low back.  Seen at urgent care 2 days ago.  Trying Pepcid but denies any improvement.  On omeprazole as well.  Notes she had dark stool this week but believes it may have been related to taking Pepto-Bismol.  Seems uncomfortable here.  Nauseous.  Tender in the epigastrium and somewhat in the right upper quadrant as well.    None       Past Medical History:   Diagnosis Date    Acid reflux        Past Surgical History:   Procedure Laterality Date     SECTION         History reviewed. No pertinent family history.  I have reviewed and agree with the history as documented.    E-Cigarette/Vaping    E-Cigarette Use Never User      E-Cigarette/Vaping Substances     Social History     Tobacco Use    Smoking status: Never    Smokeless tobacco: Never   Vaping Use    Vaping status: Never Used   Substance Use Topics    Alcohol use: Never    Drug use: Never       Review of Systems   Constitutional:  Positive for activity change and appetite change. Negative for fatigue and fever.   Respiratory:  Negative for cough, chest tightness and shortness of breath.    Gastrointestinal:  Positive for abdominal pain, nausea and vomiting. Negative for diarrhea.   Genitourinary:  Negative for difficulty urinating and dysuria.   Skin:  Negative for color change and rash.   Neurological:  Negative for dizziness,  weakness, light-headedness and headaches.   All other systems reviewed and are negative.      Physical Exam  Physical Exam  Vitals and nursing note reviewed.   Constitutional:       Appearance: She is well-developed.      Comments: Appears uncomfortable.   HENT:      Head: Normocephalic and atraumatic.   Cardiovascular:      Rate and Rhythm: Normal rate and regular rhythm.   Pulmonary:      Effort: Pulmonary effort is normal. No respiratory distress.   Abdominal:      Palpations: Abdomen is soft.      Hernia: No hernia is present.      Comments: Tenderness to the epigastrium and right upper quadrant.  No guarding.   Skin:     General: Skin is warm and dry.   Neurological:      General: No focal deficit present.      Mental Status: She is alert and oriented to person, place, and time.   Psychiatric:         Mood and Affect: Mood normal.         Behavior: Behavior normal.         Vital Signs  ED Triage Vitals [02/02/24 2054]   Temperature Pulse Respirations Blood Pressure SpO2   97.8 °F (36.6 °C) 70 16 141/68 100 %      Temp Source Heart Rate Source Patient Position - Orthostatic VS BP Location FiO2 (%)   Temporal Monitor Sitting Left arm --      Pain Score       10 - Worst Possible Pain           Vitals:    02/02/24 2054 02/02/24 2259 02/03/24 0030   BP: 141/68 (!) 171/77 (!) 172/79   Pulse: 70 70 70   Patient Position - Orthostatic VS: Sitting Lying Lying         Visual Acuity      ED Medications  Medications   ondansetron (ZOFRAN) injection 4 mg (4 mg Intravenous Given 2/2/24 2314)   HYDROmorphone (DILAUDID) injection 0.5 mg (0.5 mg Intravenous Given 2/2/24 2316)   sucralfate (CARAFATE) tablet 1 g (1 g Oral Given 2/2/24 2316)   sodium chloride 0.9 % bolus 1,000 mL (0 mL Intravenous Stopped 2/3/24 0101)   iohexol (OMNIPAQUE) 350 MG/ML injection (MULTI-DOSE) 100 mL (100 mL Intravenous Given 2/2/24 2335)   HYDROmorphone (DILAUDID) injection 1 mg (1 mg Intravenous Given 2/3/24 0124)       Diagnostic Studies  Results  Reviewed       Procedure Component Value Units Date/Time    HS Troponin I 2hr [[id number]]  (Normal) Collected: 02/02/24 2320    Lab Status: Final result Specimen: Blood from Arm, Right Updated: 02/02/24 2346     hs TnI 2hr 5 ng/L      Delta 2hr hsTnI 1 ng/L     HS Troponin I 4hr [[id number]]     Lab Status: No result Specimen: Blood     POCT pregnancy, urine [[id number]]  (Normal) Resulted: 02/02/24 2259    Lab Status: Final result Specimen: Urine Updated: 02/02/24 2259     EXT Preg Test, Ur Negative     Control Valid    HS Troponin 0hr (reflex protocol) [[id number]]  (Normal) Collected: 02/02/24 2131    Lab Status: Final result Specimen: Blood from Arm, Right Updated: 02/02/24 2211     hs TnI 0hr 4 ng/L     Comprehensive metabolic panel [[id number]]  (Abnormal) Collected: 02/02/24 2131    Lab Status: Final result Specimen: Blood from Hand, Right Updated: 02/02/24 2207     Sodium 134 mmol/L      Potassium 3.7 mmol/L      Chloride 103 mmol/L      CO2 23 mmol/L      ANION GAP 8 mmol/L      BUN 9 mg/dL      Creatinine 0.62 mg/dL      Glucose 127 mg/dL      Calcium 9.5 mg/dL      AST 14 U/L      ALT 16 U/L      Alkaline Phosphatase 79 U/L      Total Protein 7.4 g/dL      Albumin 4.4 g/dL      Total Bilirubin 0.28 mg/dL      eGFR 106 ml/min/1.73sq m     Narrative:      National Kid[employer] guidelines for Chronic Kidney Disease (CKD):     Stage 1 with normal or high GFR (GFR > 90 mL/min/1.73 square meters)    Stage 2 Mild CKD (GFR = 60-89 mL/min/1.73 square meters)    Stage 3A Moderate CKD (GFR = 45-59 mL/min/1.73 square meters)    Stage 3B Moderate CKD (GFR = 30-44 mL/min/1.73 square meters)    Stage 4 Severe CKD (GFR = 15-29 mL/min/1.73 square meters)    Stage 5 End Stage CKD (GFR <15 mL/min/1.73 square meters)  Note: GFR calculation is accurate only with a steady state creatinine    Lipase [[id number]]  (Normal) Collected: 02/02/24 2131    Lab Status: Final result Specimen: Blood from Hand, Right Updated:  02/02/24 2207     Lipase 29 u/L     Urine Microscopic [[id number]]  (Abnormal) Collected: 02/02/24 2131    Lab Status: Final result Specimen: Urine, Clean Catch Updated: 02/02/24 2152     RBC, UA 1-2 /hpf      WBC, UA None Seen /hpf      Epithelial Cells Occasional /hpf      Bacteria, UA None Seen /hpf      MUCUS THREADS Occasional    UA w Reflex to Microscopic w Reflex to Culture [[id number]]  (Abnormal) Collected: 02/02/24 2131    Lab Status: Final result Specimen: Urine, Clean Catch Updated: 02/02/24 2151     Color, UA Light Yellow     Clarity, UA Clear     Specific Gravity, UA 1.027     pH, UA 7.0     Leukocytes, UA Negative     Nitrite, UA Negative     Protein, UA Trace mg/dl      Glucose, UA Negative mg/dl      Ketones, UA Trace mg/dl      Urobilinogen, UA <2.0 mg/dl      Bilirubin, UA Negative     Occult Blood, UA Negative    CBC and differential [[id number]]  (Abnormal) Collected: 02/02/24 2131    Lab Status: Final result Specimen: Blood from Hand, Right Updated: 02/02/24 2144     WBC 10.72 Thousand/uL      RBC 4.73 Million/uL      Hemoglobin 11.3 g/dL      Hematocrit 36.5 %      MCV 77 fL      MCH 23.9 pg      MCHC 31.0 g/dL      RDW 17.1 %      MPV 9.1 fL      Platelets 348 Thousands/uL      nRBC 0 /100 WBCs      Neutrophils Relative 68 %      Immat GRANS % 1 %      Lymphocytes Relative 23 %      Monocytes Relative 5 %      Eosinophils Relative 2 %      Basophils Relative 1 %      Neutrophils Absolute 7.45 Thousands/µL      Immature Grans Absolute 0.05 Thousand/uL      Lymphocytes Absolute 2.49 Thousands/µL      Monocytes Absolute 0.51 Thousand/µL      Eosinophils Absolute 0.17 Thousand/µL      Basophils Absolute 0.05 Thousands/µL                    CT abdomen pelvis with contrast   Final Result by Vitaliy Watson DO (02/03 0050)   Cholelithiasis. No CT evidence of acute cholecystitis.   Chronic appearing peripherally calcified splenic artery aneurysm measuring up to 2.5 cm. Nonemergent vascular/interventional  "radiologic consultation is advised.   Simple cyst in the left retroperitoneal region measuring up to 2.9 cm, indeterminate. Differential includes mesenteric cyst. Short-term follow-up 3-month contrast-enhanced CT abdomen pelvis is recommended to ensure stability.   This study was marked for \"Immediate\" notification in EPIC.         Workstation performed: [id number]                    Procedures  ECG 12 Lead Documentation Only    Date/Time: 2/2/2024 11:21 PM    Performed by: Kwaku Meyer MD  Authorized by: Kwaku Meyer MD    Indications / Diagnosis:  Epigastric pain  ECG reviewed by me, the ED Provider: yes    Patient location:  ED  Previous ECG:     Previous ECG:  Unavailable  Rate:     ECG rate:  68  Rhythm:     Rhythm: sinus rhythm    Ectopy:     Ectopy: none    QRS:     QRS axis:  Normal  Conduction:     Conduction: normal    ST segments:     ST segments:  Normal  T waves:     T waves: non-specific             ED Course  ED Course as of 02/03/24 0138   Sat Feb 03, 2024   0112 Discussed CT results with patient.  She is still having large amount of pain.  Would not feel comfortable going home.                                             Medical Decision Making  48-year-old female presented for evaluation of worsening abdominal pain over the last 1 to 2 weeks.  It initially was feeling like an exacerbation of acid reflux with epigastric/burning sensation worsening with eating.  States that over the last few days it has been more consistent, more severe and not related to eating.  She is also having pain in the low back.  Seen at urgent care 2 days ago.  Trying Pepcid but denies any improvement.  On omeprazole as well.  Notes she had dark stool this week but believes it may have been related to taking Pepto-Bismol.  Seems uncomfortable here.  Nauseous.  Tender in the epigastrium and somewhat in the right upper quadrant as well.    Lab work notable for mild microcytic anemia.  CT shows cholelithiasis " without cholecystitis, incidental findings of calcified splenic artery aneurysm and a nonspecific retroperitoneal cyst.  Will dose of IV analgesics needed for pain control.  Admitted for continued symptomatic treatment and further workup.    Amount and/or Complexity of Data Reviewed  Labs: ordered.  Radiology: ordered.    Risk  Prescription drug management.  Decision regarding hospitalization.             Disposition  Final diagnoses:   Cholelithiasis without cholecystitis   GERD (gastroesophageal reflux disease)     Time reflects when diagnosis was documented in both MDM as applicable and the Disposition within this note       Time User Action Codes Description Comment    2/3/2024  1:35 AM Kwaku Meyer [K80.20] Cholelithiasis without cholecystitis     2/3/2024  1:36 AM Kwaku Meyer [K21.9] GERD (gastroesophageal reflux disease)           ED Disposition       ED Disposition   Admit    Condition   Stable    Date/Time   Sat Feb 3, 2024  1:36 AM    Comment   Case was discussed with Rosie and the patient's admission status was agreed to be Admission Status: observation status to the service of Dr. Gregorio.               Follow-up Information    None         Patient's Medications    No medications on file       No discharge procedures on file.    PDMP Review       None            ED Provider  Electronically Signed by             Kwaku Meyer MD  02/03/24 0138

## 2024-02-03 NOTE — PROGRESS NOTES
Vascular surgery note    Recommend transfer to [facility] for vascular surgery evaluation of splenic artery aneurysm with associated abdominal pain   Appreciate GI recs for associated GERD    Addendum:  RUQ US with evidence of acute alfonzo  Gen surg consulted; appreciate recs  No need for vascular surgery transfer at this time

## 2024-02-03 NOTE — ANESTHESIA POSTPROCEDURE EVALUATION
Post-Op Assessment Note    CV Status:  Stable  Pain Score: 0    Pain management: adequate       Mental Status:  Alert and awake   Hydration Status:  Stable   PONV Controlled:  None   Airway Patency:  Patent and adequate     Post Op Vitals Reviewed: Yes    No anethesia notable event occurred.    Staff: Anesthesiologist, CRNA               BP   116/56   Temp   98.4   Pulse  90   Resp   18   SpO2   100%     
Detail Level: Detailed

## 2024-02-03 NOTE — ASSESSMENT & PLAN NOTE
Incidental finding of a simple cyst in left retroperitoneal region, indeterminate  Recommendation to follow-up in 3 months for CT abdomen pelvis for monitoring  Due to being partially thrombosed did reach out to on-call vascular surgery who reports anticoagulation not needed at this time as this could be a normal finding, however, due to patient being within childbearing age and aneurysm is 2.5 cm or greater her aneurysm does warrant repair most likely electively  Reviewed finding with patient and recommendation to follow-up outpatient after discharge with vascular surgery  Vascular surgery also reports at this time patient does not need to be transferred to [facility]

## 2024-02-03 NOTE — INCIDENTAL FINDINGS
"The following findings require follow up:  Radiographic finding   Finding: \"Chronic appearing peripherally calcified and partially thrombosed splenic artery aneurysm measuring up to 2.5 cm. Nonemergent vascular/interventional radiologic consultation is advised...    Simple cyst in the left retroperitoneal region measuring up to 2.9 cm, indeterminate. Differential includes mesenteric cyst. Short-term follow-up 3-month contrast-enhanced CT abdomen pelvis is recommended to ensure stability.     Follow up required: Outpatient with vascular surgery for splenic artery aneurysm and with primary care physician for simple cyst in left retroperitoneal region   Follow up should be done within 1 week(s) with vascular surgery for splenic artery aneurysm and 3-month follow-up with primary care physician for left retroperitoneal cyst    Please notify the following clinician to assist with the follow up:   Vascular surgery and primary care physician  "

## 2024-02-03 NOTE — CONSULTS
Consultation -  [facility]  Niyah Hall 48 y.o. female MRN: [id number]  Unit/Bed#: -01 Encounter: [id number]      Inpatient consult to gastroenterology  Consult performed by: Noemy Purvis PA-C  Consult ordered by: Rosie Hendrix PA-C          Reason for Consult / Principal Problem: Epigastric pain/reflux    HPI: Niyah Hall is a 48 y.o. year old female who presents with a past medical history significant for gastroesophageal reflux disease.    Patient presents to [facility] emergency department yesterday with a chief complaint severe acid reflux, nausea, upper abdominal pain radiating to her back.  Patient reports that she has been suffering with acid reflux symptoms since the birth of her fifth child 6 years ago.  She reports that since that time she felt like her stomach is never been quite right.  She reports that she is never followed up with a gastroenterologist.  She reports that she has on and off symptoms in regards to acid reflux but for the past week she has been having severe abdominal pain and burning every other day.  She reports that she has been evaluated in the urgent care.  She was given famotidine therapy.  She has not been on PPI therapy as a maintenance medication.    CT AP from 2/2/2024 with contrast shows cholelithiasis. No CT evidence of acute cholecystitis. Chronic appearing peripherally calcified splenic artery aneurysm measuring up to 2.5 cm. Nonemergent vascular/interventional radiologic consultation is advised. Simple cyst in the left retroperitoneal region measuring up to 2.9 cm, indeterminate.    Right upper quadrant ultrasound is pending.  Patient's white blood cell count slightly elevated on admission.  Hemoglobin 11.3.    REVIEW OF SYSTEMS:     CONSTITUTIONAL: Denies any fever, chills, or rigors. Good appetite, and no recent weight loss.  HEENT: No earache or tinnitus. Denies hearing loss or visual disturbances.  CARDIOVASCULAR: No chest pain  or palpitations.   RESPIRATORY: Denies any cough, hemoptysis, shortness of breath or dyspnea on exertion.  GASTROINTESTINAL: As noted in the History of Present Illness.   GENITOURINARY: No problems with urination. Denies any hematuria or dysuria.  NEUROLOGIC: No dizziness or vertigo, denies headaches.   MUSCULOSKELETAL: Denies any muscle or joint pain.   SKIN: Denies skin rashes or itching.   ENDOCRINE: Denies excessive thirst. Denies intolerance to heat or cold.  PSYCHOSOCIAL: Denies depression or anxiety. Denies any recent memory loss.     Historical Information   Past Medical History:   Diagnosis Date    Acid reflux      Past Surgical History:   Procedure Laterality Date     SECTION       Social History   Social History     Substance and Sexual Activity   Alcohol Use Never     Social History     Substance and Sexual Activity   Drug Use Never     Social History     Tobacco Use   Smoking Status Never   Smokeless Tobacco Never     History reviewed. No pertinent family history.    Meds/Allergies     Medications Prior to Admission   Medication    famotidine (PEPCID) 20 mg tablet    omeprazole (PriLOSEC OTC) 20 MG tablet     Current Facility-Administered Medications   Medication Dose Route Frequency    acetaminophen (Ofirmev) injection 1,000 mg  1,000 mg Intravenous Q8H PRN    aluminum-magnesium hydroxide-simethicone (MAALOX) oral suspension 30 mL  30 mL Oral Q6H PRN    dicyclomine (BENTYL) capsule 10 mg  10 mg Oral Q6H PRN    metoclopramide (REGLAN) injection 5 mg  5 mg Intravenous Q6H PRN    ondansetron (ZOFRAN) injection 4 mg  4 mg Intravenous Q4H PRN    pantoprazole (PROTONIX) injection 40 mg  40 mg Intravenous Q12H LE    sodium chloride 0.9 % infusion  50 mL/hr Intravenous Continuous    sucralfate (CARAFATE) tablet 1 g  1 g Oral 4x Daily (AC & HS)       No Known Allergies    Objective   Blood pressure 129/60, pulse 79, temperature 97.8 °F (36.6 °C), resp. rate 16, last menstrual period 2024, SpO2  94%.  No intake or output data in the 24 hours ending 02/03/24 0938      PHYSICAL EXAM:      General Appearance:   Alert, cooperative, no distress, appears stated age    HEENT:   Normocephalic, atraumatic, anicteric.     Neck:  Supple, symmetrical, trachea midline, no adenopathy;    thyroid: no enlargement/tenderness/nodules; no carotid  bruit or JVD    Lungs:   Clear to auscultation bilaterally; no rales, rhonchi or wheezing; respirations unlabored    Heart::   S1 and S2 normal; regular rate and rhythm; no murmur, rub, or gallop.   Abdomen:   Soft, non-tender, non-distended; normal bowel sounds; no masses, no organomegaly    Genitalia:   Deferred    Rectal:   Deferred    Extremities:  No cyanosis, clubbing or edema    Pulses:  2+ and symmetric all extremities    Skin:  Skin color, texture, turgor normal, no rashes or lesions    Lymph nodes:  No palpable cervical, axillary or inguinal lymphadenopathy        Lab Results:   Admission on 02/02/2024   Component Date Value    WBC 02/02/2024 10.72 (H)     RBC 02/02/2024 4.73     Hemoglobin 02/02/2024 11.3 (L)     Hematocrit 02/02/2024 36.5     MCV 02/02/2024 77 (L)     MCH 02/02/2024 23.9 (L)     MCHC 02/02/2024 31.0 (L)     RDW 02/02/2024 17.1 (H)     MPV 02/02/2024 9.1     Platelets 02/02/2024 348     nRBC 02/02/2024 0     Neutrophils Relative 02/02/2024 68     Immat GRANS % 02/02/2024 1     Lymphocytes Relative 02/02/2024 23     Monocytes Relative 02/02/2024 5     Eosinophils Relative 02/02/2024 2     Basophils Relative 02/02/2024 1     Neutrophils Absolute 02/02/2024 7.45     Immature Grans Absolute 02/02/2024 0.05     Lymphocytes Absolute 02/02/2024 2.49     Monocytes Absolute 02/02/2024 0.51     Eosinophils Absolute 02/02/2024 0.17     Basophils Absolute 02/02/2024 0.05     Sodium 02/02/2024 134 (L)     Potassium 02/02/2024 3.7     Chloride 02/02/2024 103     CO2 02/02/2024 23     ANION GAP 02/02/2024 8     BUN 02/02/2024 9     Creatinine 02/02/2024 0.62     Glucose  02/02/2024 127     Calcium 02/02/2024 9.5     AST 02/02/2024 14     ALT 02/02/2024 16     Alkaline Phosphatase 02/02/2024 79     Total Protein 02/02/2024 7.4     Albumin 02/02/2024 4.4     Total Bilirubin 02/02/2024 0.28     eGFR 02/02/2024 106     Lipase 02/02/2024 29     Color, UA 02/02/2024 Light Yellow     Clarity, UA 02/02/2024 Clear     Specific Gravity, UA 02/02/2024 1.027     pH, UA 02/02/2024 7.0     Leukocytes, UA 02/02/2024 Negative     Nitrite, UA 02/02/2024 Negative     Protein, UA 02/02/2024 Trace (A)     Glucose, UA 02/02/2024 Negative     Ketones, UA 02/02/2024 Trace (A)     Urobilinogen, UA 02/02/2024 <2.0     Bilirubin, UA 02/02/2024 Negative     Occult Blood, UA 02/02/2024 Negative     EXT Preg Test, Ur 02/02/2024 Negative     Control 02/02/2024 Valid     hs TnI 0hr 02/02/2024 4     Ventricular Rate 02/02/2024 68     Atrial Rate 02/02/2024 68     NH Interval 02/02/2024 140     QRSD Interval 02/02/2024 70     QT Interval 02/02/2024 396     QTC Interval 02/02/2024 421     P Axis 02/02/2024 48     QRS Axis 02/02/2024 67     T Wave Nixon 02/02/2024 -16     RBC, UA 02/02/2024 1-2     WBC, UA 02/02/2024 None Seen     Epithelial Cells 02/02/2024 Occasional     Bacteria, UA 02/02/2024 None Seen     MUCUS THREADS 02/02/2024 Occasional (A)     hs TnI 2hr 02/02/2024 5     Delta 2hr hsTnI 02/02/2024 1        Imaging Studies: I have personally reviewed pertinent imaging studies.        ASSESSMENT & PLAN:  GERD   Epigastric pain  Cholelithiasis  -Patient presents to [facility] emergency department with a chief complaint of epigastric abdominal pain and burning.  Patient with a history of acid reflux disease.  Patient is not maintained on any PPI therapy.  -CT AP suggesting cholelithiasis as well as a splenic artery aneurysm that is chronic.  -Patient will have outpatient vascular surgery evaluation.  -Right upper quadrant ultrasound pending.  -Patient should undergo an upper endoscopy at some point.  This can be  performed in the outpatient setting.  -Pantoprazole 40 mg twice daily.  -Serial abdominal exams.  -Continue to trend CBC and CMP.  -Diet as tolerated.  -Antiemetics and pain control.    Patient will be seen and examined by Dr. Hloly.  All key medical decisions made by Dr. Holly.  If patient's abdominal pain does improve I do believe she can be discharged over the weekend for outpatient follow-up.    Noemy Purvis PA-C  2/3/2024,9:38 AM

## 2024-02-04 VITALS
DIASTOLIC BLOOD PRESSURE: 78 MMHG | BODY MASS INDEX: 38.24 KG/M2 | HEART RATE: 72 BPM | WEIGHT: 223.99 LBS | SYSTOLIC BLOOD PRESSURE: 121 MMHG | HEIGHT: 64 IN | OXYGEN SATURATION: 93 % | TEMPERATURE: 98.4 F | RESPIRATION RATE: 17 BRPM

## 2024-02-04 PROBLEM — K81.0 ACUTE CHOLECYSTITIS: Status: ACTIVE | Noted: 2024-02-03

## 2024-02-04 PROBLEM — I72.8 SPLENIC ARTERY ANEURYSM (HCC): Status: ACTIVE | Noted: 2024-02-04

## 2024-02-04 LAB
ALBUMIN SERPL BCP-MCNC: 3.7 G/DL (ref 3.5–5)
ALP SERPL-CCNC: 69 U/L (ref 34–104)
ALT SERPL W P-5'-P-CCNC: 27 U/L (ref 7–52)
ANION GAP SERPL CALCULATED.3IONS-SCNC: 5 MMOL/L
AST SERPL W P-5'-P-CCNC: 28 U/L (ref 13–39)
BASOPHILS # BLD AUTO: 0.02 THOUSANDS/ÂΜL (ref 0–0.1)
BASOPHILS NFR BLD AUTO: 0 % (ref 0–1)
BILIRUB SERPL-MCNC: 0.31 MG/DL (ref 0.2–1)
BUN SERPL-MCNC: 5 MG/DL (ref 5–25)
CALCIUM SERPL-MCNC: 8.6 MG/DL (ref 8.4–10.2)
CHLORIDE SERPL-SCNC: 109 MMOL/L (ref 96–108)
CO2 SERPL-SCNC: 25 MMOL/L (ref 21–32)
CREAT SERPL-MCNC: 0.55 MG/DL (ref 0.6–1.3)
EOSINOPHIL # BLD AUTO: 0.01 THOUSAND/ÂΜL (ref 0–0.61)
EOSINOPHIL NFR BLD AUTO: 0 % (ref 0–6)
ERYTHROCYTE [DISTWIDTH] IN BLOOD BY AUTOMATED COUNT: 17.2 % (ref 11.6–15.1)
GFR SERPL CREATININE-BSD FRML MDRD: 110 ML/MIN/1.73SQ M
GLUCOSE P FAST SERPL-MCNC: 98 MG/DL (ref 65–99)
GLUCOSE SERPL-MCNC: 98 MG/DL (ref 65–140)
HCT VFR BLD AUTO: 31.6 % (ref 34.8–46.1)
HGB BLD-MCNC: 9.7 G/DL (ref 11.5–15.4)
IMM GRANULOCYTES # BLD AUTO: 0.05 THOUSAND/UL (ref 0–0.2)
IMM GRANULOCYTES NFR BLD AUTO: 0 % (ref 0–2)
LYMPHOCYTES # BLD AUTO: 1.35 THOUSANDS/ÂΜL (ref 0.6–4.47)
LYMPHOCYTES NFR BLD AUTO: 11 % (ref 14–44)
MCH RBC QN AUTO: 24.1 PG (ref 26.8–34.3)
MCHC RBC AUTO-ENTMCNC: 30.7 G/DL (ref 31.4–37.4)
MCV RBC AUTO: 78 FL (ref 82–98)
MONOCYTES # BLD AUTO: 0.45 THOUSAND/ÂΜL (ref 0.17–1.22)
MONOCYTES NFR BLD AUTO: 4 % (ref 4–12)
NEUTROPHILS # BLD AUTO: 10.16 THOUSANDS/ÂΜL (ref 1.85–7.62)
NEUTS SEG NFR BLD AUTO: 85 % (ref 43–75)
NRBC BLD AUTO-RTO: 0 /100 WBCS
PLATELET # BLD AUTO: 289 THOUSANDS/UL (ref 149–390)
PMV BLD AUTO: 9 FL (ref 8.9–12.7)
POTASSIUM SERPL-SCNC: 4.1 MMOL/L (ref 3.5–5.3)
PROT SERPL-MCNC: 6.1 G/DL (ref 6.4–8.4)
RBC # BLD AUTO: 4.03 MILLION/UL (ref 3.81–5.12)
SODIUM SERPL-SCNC: 139 MMOL/L (ref 135–147)
WBC # BLD AUTO: 12.04 THOUSAND/UL (ref 4.31–10.16)

## 2024-02-04 PROCEDURE — 80053 COMPREHEN METABOLIC PANEL: CPT | Performed by: PHYSICIAN ASSISTANT

## 2024-02-04 PROCEDURE — 85025 COMPLETE CBC W/AUTO DIFF WBC: CPT | Performed by: PHYSICIAN ASSISTANT

## 2024-02-04 PROCEDURE — C9113 INJ PANTOPRAZOLE SODIUM, VIA: HCPCS | Performed by: PHYSICIAN ASSISTANT

## 2024-02-04 PROCEDURE — 99239 HOSP IP/OBS DSCHRG MGMT >30: CPT | Performed by: INTERNAL MEDICINE

## 2024-02-04 RX ORDER — FAMOTIDINE 20 MG/1
20 TABLET, FILM COATED ORAL 2 TIMES DAILY PRN
Qty: 30 TABLET | Refills: 2 | Status: SHIPPED | OUTPATIENT
Start: 2024-02-04 | End: 2024-03-05

## 2024-02-04 RX ORDER — OXYCODONE HYDROCHLORIDE 5 MG/1
5 TABLET ORAL EVERY 4 HOURS PRN
Qty: 10 TABLET | Refills: 0 | Status: SHIPPED | OUTPATIENT
Start: 2024-02-04 | End: 2024-02-19

## 2024-02-04 RX ORDER — ATORVASTATIN CALCIUM 40 MG/1
40 TABLET, FILM COATED ORAL DAILY
Qty: 30 TABLET | Refills: 1 | Status: SHIPPED | OUTPATIENT
Start: 2024-02-04 | End: 2024-04-04

## 2024-02-04 RX ADMIN — DOCUSATE SODIUM 100 MG: 100 CAPSULE, LIQUID FILLED ORAL at 10:14

## 2024-02-04 RX ADMIN — ACETAMINOPHEN 1000 MG: 10 INJECTION INTRAVENOUS at 06:18

## 2024-02-04 RX ADMIN — HEPARIN SODIUM 5000 UNITS: 5000 INJECTION INTRAVENOUS; SUBCUTANEOUS at 01:00

## 2024-02-04 RX ADMIN — SODIUM CHLORIDE, SODIUM LACTATE, POTASSIUM CHLORIDE, AND CALCIUM CHLORIDE 100 ML/HR: .6; .31; .03; .02 INJECTION, SOLUTION INTRAVENOUS at 06:15

## 2024-02-04 RX ADMIN — SUCRALFATE 1 G: 1 TABLET ORAL at 06:14

## 2024-02-04 RX ADMIN — PANTOPRAZOLE SODIUM 40 MG: 40 INJECTION, POWDER, FOR SOLUTION INTRAVENOUS at 10:14

## 2024-02-04 RX ADMIN — ASPIRIN 81 MG: 81 TABLET, COATED ORAL at 10:14

## 2024-02-04 NOTE — ASSESSMENT & PLAN NOTE
Noted to have 2.5 cm splenic artery aneurysm likely chronic with calcification found incidentally on imaging.  Will need vascular surgery referral as soon as possible outpatient.  Was advised to follow-up with PCP as she is a resident of [place] and will require referral prior to be seen by vascular surgery over there.    Continue aspirin, statin

## 2024-02-04 NOTE — UTILIZATION REVIEW
Initial Clinical Review    Admission: Date/Time/Statement:   Admission Orders (From admission, onward)       Ordered        02/03/24 0135  Place in Observation  Once                          Orders Placed This Encounter   Procedures    Place in Observation     Standing Status:   Standing     Number of Occurrences:   1     Order Specific Question:   Level of Care     Answer:   Med Surg [16]     ED Arrival Information       Expected   -    Arrival   2/2/2024 20:46    Acuity   Urgent              Means of arrival   Walk-In    Escorted by   Family Member    Service   Hospitalist    Admission type   Emergency              Arrival complaint   Abdominal Pain, Back Pain, Syncope             Chief Complaint   Patient presents with    Abdominal Pain     Patient reports abdominal pain and left back pain for about a week, seen at urgent care for same and prescribed meds but not helping.       Initial Presentation: 48 y.o. female to ED from home w/  PMH of GERD who presents with complaint of sudden onset moderate to severe constant burning/aching epigastric pain with rx to LUQ that occasionally does radiate to her back starting about a week ago.  relieved somewhat w/ dilaudid . + nausea. Seen at urgent care on Monday and given pepcid , w/ no relief. Admitted OBS status w/ epigastric pain plan for antiemetics , GI consult , PPI IV , RUQ US .     2/3 GI Consult   CT scan of the abdomen pelvis on 2/2 which I viewed personally shows cholelithiasis without cholecystitis, and a simple cyst in the left retroperitoneal region which measures up to 2.9 cm, and chronically appearing peripherally calcified splenic artery aneurysm which measures up to 2.5 cm  -Ultrasound performed 2/3 shows sonographic evidence of cholelithiasis with acute cholecystitis.  There is marked diffuse thickening of the gallbladder wall with striations suggesting gallbladder wall edema. Plan for OR for lap alfonzo, PPI , antiemetics and pain control .     2/3 Surgery  Consult   Plan for OR today for laparoscopic cholecystectomy, possible open . NPO , IVF , PPI , trend labs , pain control .       Date:    Day 2:     ED Triage Vitals [02/02/24 2054]   Temperature Pulse Respirations Blood Pressure SpO2   97.8 °F (36.6 °C) 70 16 141/68 100 %      Temp Source Heart Rate Source Patient Position - Orthostatic VS BP Location FiO2 (%)   Temporal Monitor Sitting Left arm --      Pain Score       10 - Worst Possible Pain          Wt Readings from Last 1 Encounters:   02/03/24 102 kg (223 lb 15.8 oz)     Additional Vital Signs:   02/04/24 0413 -- -- 18 102/45 Abnormal  -- -- -- -- -- -- -- Lying   02/04/24 04:11:21 98.5 °F (36.9 °C) 75 18 99/55 70 92 % -- -- -- -- -- Sitting   02/03/24 2340 -- -- -- -- -- -- -- -- -- None (Room air) -- --   02/03/24 23:18:35 98.4 °F (36.9 °C) 71 18 109/54 72 96 % -- -- -- None (Room air) -- --   02/03/24 1825 98 °F (36.7 °C) 84 18 119/71 87 91 % -- -- -- -- -- Lying   02/03/24 17:48:51 -- 78 -- 114/68 83 91 % -- -- -- -- -- --   02/03/24 17:29:50 98.7 °F (37.1 °C) 79 17 112/66 81 94 % -- -- -- None (Room air) -- Lying   02/03/24 17:27:39 98.7 °F (37.1 °C) 77 17 109/68 82 93 % -- -- -- None (Room air) -- --   02/03/24 1700 98.6 °F (37 °C) 76 22 109/56 75 95 % -- -- -- None (Room air) WDL --   02/03/24 1645 -- 76 16 103/57 76 86 % Abnormal  32 -- 3 L/min Nasal cannula WDL --   02/03/24 1636 -- 80 22 -- -- 95 % -- -- -- -- -- --   02/03/24 1630 -- 91 21 110/57 79 92 % -- 6 L/min -- Simple mask WDL --   02/03/24 1621 98.3 °F (36.8 °C) 88 23 Abnormal  116/56 80 94 % -- 6 L/min -- Simple mask WDL --   02/03/24 1330 -- -- -- -- -- 96 % -- -- -- None (Room air) -- --   02/03/24 0800 -- 79 16 129/60 87 94 % -- -- -- -- -- --   02/03/24 0700 -- 74 17 165/79 114 95 % -- -- -- -- -- --   02/03/24 0645 -- 74 19 -- -- 96 % -- -- -- -- -- --   02/03/24 0630 -- 70 20 -- -- 96 % -- -- -- -- -- --   02/03/24 0615 -- 90 21 -- -- 97 % -- -- -- -- -- --   02/03/24 0600 -- 97 21  "134/65 93 94 % -- -- -- None (Room air) -- --   02/03/24 0545 -- 83 21 -- -- 94 % -- -- -- -- -- --   02/03/24 0530 -- 72 21 -- -- 94 % -- -- -- -- -- --   02/03/24 0515 -- 72 20 -- -- 95 % -- -- -- -- -- --   02/03/24 0500 -- 71 21 149/74 105 95 % -- -- -- -- -- --   02/03/24 0454 97.8 °F (36.6 °C) 76 15 148/70 -- 97 % -- -- -- None (Room air) -- --   02/03/24 0445 -- 76 15 -- -- 97 % -- -- -- -- -- --   02/03/24 0430 -- 84 19 -- -- 96 % -- -- -- -- -- --   02/03/24 0400 -- 75 22 148/70 101 94 % -- -- -- -- -- --   02/03/24 0345 -- 72 22 -- -- 93 % -- -- -- -- -- --   02/03/24 0330 -- 72 22 -- -- 93 % -- -- -- -- -- --   02/03/24 0130 -- 82 19 156/80 111 97 % -- -- -- None (Room air) -- Lying   02/03/24 0030 -- 70 20 172/79 Abnormal  113 95 % -- -- -- None (Room air) -- Lying   02/02/24 2259 -- 70 20 171/77 Abnormal  -- 100 % -- -- -- None (Room air) -- Lying     Pertinent Labs/Diagnostic Test Results:   2/2 EKG NSR   US right upper quadrant   Final Result by Jaclyn Mccarthy MD (02/03 1034)      Sonographic findings detailed above suspicious for acute cholecystitis. Correlate with clinical parameters.      The study was marked in EPIC for immediate notification. Tiger text communication sent to and acknowledged by gastroenterology consult Noemy Purvis and medical resident Charlie      Workstation performed: [id number]         CT abdomen pelvis with contrast   Final Result by Vitaliy Watson DO (02/03 0050)   Cholelithiasis. No CT evidence of acute cholecystitis.   Chronic appearing peripherally calcified splenic artery aneurysm measuring up to 2.5 cm. Nonemergent vascular/interventional radiologic consultation is advised.   Simple cyst in the left retroperitoneal region measuring up to 2.9 cm, indeterminate. Differential includes mesenteric cyst. Short-term follow-up 3-month contrast-enhanced CT abdomen pelvis is recommended to ensure stability.   This study was marked for \"Immediate\" notification in " EPIC.         Workstation performed: [id number]               Results from last 7 days   Lab Units 02/04/24  0450 02/02/24  2131   WBC Thousand/uL 12.04* 10.72*   HEMOGLOBIN g/dL 9.7* 11.3*   HEMATOCRIT % 31.6* 36.5   PLATELETS Thousands/uL 289 348   NEUTROS ABS Thousands/µL 10.16* 7.45         Results from last 7 days   Lab Units 02/04/24  0450 02/02/24  2131   SODIUM mmol/L 139 134*   POTASSIUM mmol/L 4.1 3.7   CHLORIDE mmol/L 109* 103   CO2 mmol/L 25 23   ANION GAP mmol/L 5 8   BUN mg/dL 5 9   CREATININE mg/dL 0.55* 0.62   EGFR ml/min/1.73sq m 110 106   CALCIUM mg/dL 8.6 9.5     Results from last 7 days   Lab Units 02/04/24  0450 02/02/24  2131   AST U/L 28 14   ALT U/L 27 16   ALK PHOS U/L 69 79   TOTAL PROTEIN g/dL 6.1* 7.4   ALBUMIN g/dL 3.7 4.4   TOTAL BILIRUBIN mg/dL 0.31 0.28         Results from last 7 days   Lab Units 02/04/24  0450 02/02/24  2131   GLUCOSE RANDOM mg/dL 98 127       Results from last 7 days   Lab Units 02/02/24  2320 02/02/24  2131   HS TNI 0HR ng/L  --  4   HS TNI 2HR ng/L 5  --    HSTNI D2 ng/L 1  --          Results from last 7 days   Lab Units 02/02/24  2131   LIPASE u/L 29     Results from last 7 days   Lab Units 02/02/24  2131   CLARITY UA  Clear   COLOR UA  Light Yellow   SPEC GRAV UA  1.027   PH UA  7.0   GLUCOSE UA mg/dl Negative   KETONES UA mg/dl Trace*   BLOOD UA  Negative   PROTEIN UA mg/dl Trace*   NITRITE UA  Negative   BILIRUBIN UA  Negative   UROBILINOGEN UA (BE) mg/dl <2.0   LEUKOCYTES UA  Negative   WBC UA /hpf None Seen   RBC UA /hpf 1-2   BACTERIA UA /hpf None Seen   EPITHELIAL CELLS WET PREP /hpf Occasional   MUCUS THREADS  Occasional*     ED Treatment:   Medication Administration from 02/02/2024 2046 to 02/03/2024 0853         Date/Time Order Dose Route Action     02/02/2024 2314 EST ondansetron (ZOFRAN) injection 4 mg 4 mg Intravenous Given     02/02/2024 2316 EST HYDROmorphone (DILAUDID) injection 0.5 mg 0.5 mg Intravenous Given     02/02/2024 2316 EST sucralfate  (CARAFATE) tablet 1 g 1 g Oral Given     02/02/2024 2316 EST sodium chloride 0.9 % bolus 1,000 mL 1,000 mL Intravenous New Bag     02/03/2024 0124 EST HYDROmorphone (DILAUDID) injection 1 mg 1 mg Intravenous Given     02/03/2024 0433 EST pantoprazole (PROTONIX) injection 40 mg 40 mg Intravenous Given     02/03/2024 0433 EST sodium chloride 0.9 % infusion 50 mL/hr Intravenous New Bag     02/03/2024 0614 EST sucralfate (CARAFATE) tablet 1 g 1 g Oral Given     02/03/2024 0748 EST ketorolac (TORADOL) injection 15 mg 15 mg Intravenous Given          Past Medical History:   Diagnosis Date    Acid reflux      Present on Admission:  **None**      Admitting Diagnosis: Epigastric pain [R10.13]  GERD (gastroesophageal reflux disease) [K21.9]  Abdominal pain [R10.9]  Cholelithiasis without cholecystitis [K80.20]  Age/Sex: 48 y.o. female  Admission Orders:  Scheduled Medications:  aspirin, 81 mg, Oral, Daily  docusate sodium, 100 mg, Oral, BID  heparin (porcine), 5,000 Units, Subcutaneous, Q8H LE  pantoprazole, 40 mg, Intravenous, Q12H LE  sucralfate, 1 g, Oral, 4x Daily (AC & HS)      Continuous IV Infusions:  lactated ringers, 100 mL/hr, Intravenous, Continuous      PRN Meds:  acetaminophen, 1,000 mg, Intravenous, Q8H PRN  aluminum-magnesium hydroxide-simethicone, 30 mL, Oral, Q6H PRN  dicyclomine, 10 mg, Oral, Q6H PRN  metoclopramide, 5 mg, Intravenous, Q6H PRN  morphine injection, 2 mg, Intravenous, Q3H PRN  ondansetron, 4 mg, Intravenous, Q4H PRN  oxyCODONE, 5 mg, Oral, Q4H PRN  traMADol, 50 mg, Oral, Q6H PRN    NPO to surgical soft diet       IP CONSULT TO GASTROENTEROLOGY  IP CONSULT TO ACUTE CARE SURGERY  IP CONSULT TO ACUTE CARE SURGERY    Network Utilization Review Department  ATTENTION: Please call with any questions or concerns to [phone] and carefully listen to the prompts so that you are directed to the right person. All voicemails are confidential.   For Discharge needs, contact Care Management DC Support  Team at [phone] opt. [phone]  Send all requests for admission clinical reviews, approved or denied determinations and any other requests to dedicated fax number below belonging to the campus where the patient is receiving treatment. List of dedicated fax numbers for the Facilities:  FACILITY NAME UR FAX NUMBER   ADMISSION DENIALS (Administrative/Medical Necessity) [phone]   DISCHARGE SUPPORT TEAM (NETWORK) [phone]   PARENT CHILD HEALTH (Maternity/NICU/Pediatrics) [phone]   [facility] [phone]   [facility] [phone]   [facility] [phone]   [facility] [phone]   [facility] [phone]   [facility] [phone]   [facility] [phone]   [facility] [phone]   [facility] [phone]   [facility] [phone]   [facility] [phone]   [facility] [phone]

## 2024-02-04 NOTE — INCIDENTAL FINDINGS
The following findings require follow up:  Radiographic finding   Finding: CT abdomen pelvis with contrast: Chronic appearing peripherally calcified splenic artery aneurysm measuring up to 2.5 cm. Nonemergent vascular/interventional radiologic consultation is advised., Simple cyst in the left retroperitoneal region measuring up to 2.9 cm, indeterminate. Differential includes mesenteric cyst. Short-term follow-up 3-month contrast-enhanced CT abdomen pelvis is recommended to ensure stability.,   Follow up required: PCP   Follow up should be done within 1 week(s)    Please notify the following clinician to assist with the follow up:   PCP

## 2024-02-04 NOTE — PLAN OF CARE
Problem: PAIN - ADULT  Goal: Verbalizes/displays adequate comfort level or baseline comfort level  Description: Interventions:  - Encourage patient to monitor pain and request assistance  - Assess pain using appropriate pain scale  - Administer analgesics based on type and severity of pain and evaluate response  - Implement non-pharmacological measures as appropriate and evaluate response  - Consider cultural and social influences on pain and pain management  - Notify physician/advanced practitioner if interventions unsuccessful or patient reports new pain  Outcome: Progressing     Problem: INFECTION - ADULT  Goal: Absence or prevention of progression during hospitalization  Description: INTERVENTIONS:  - Assess and monitor for signs and symptoms of infection  - Monitor lab/diagnostic results  - Monitor all insertion sites, i.e. indwelling lines, tubes, and drains  - Monitor endotracheal if appropriate and nasal secretions for changes in amount and color  - [place] appropriate cooling/warming therapies per order  - Administer medications as ordered  - Instruct and encourage patient and family to use good hand hygiene technique  - Identify and instruct in appropriate isolation precautions for identified infection/condition  Outcome: Progressing  Goal: Absence of fever/infection during neutropenic period  Description: INTERVENTIONS:  - Monitor WBC    Outcome: Progressing     Problem: SAFETY ADULT  Goal: Patient will remain free of falls  Description: INTERVENTIONS:  - Educate patient/family on patient safety including physical limitations  - Instruct patient to call for assistance with activity   - Consult OT/PT to assist with strengthening/mobility   - Keep Call bell within reach  - Keep bed low and locked with side rails adjusted as appropriate  - Keep care items and personal belongings within reach  - Initiate and maintain comfort rounds  - Make Fall Risk Sign visible to staff  - Offer Toileting every  Hours,  in advance of need  - Initiate/Maintain alarm  - Obtain necessary fall risk management equipment:   - Apply yellow socks and bracelet for high fall risk patients  - Consider moving patient to room near nurses station  Outcome: Progressing  Goal: Maintain or return to baseline ADL function  Description: INTERVENTIONS:  -  Assess patient's ability to carry out ADLs; assess patient's baseline for ADL function and identify physical deficits which impact ability to perform ADLs (bathing, care of mouth/teeth, toileting, grooming, dressing, etc.)  - Assess/evaluate cause of self-care deficits   - Assess range of motion  - Assess patient's mobility; develop plan if impaired  - Assess patient's need for assistive devices and provide as appropriate  - Encourage maximum independence but intervene and supervise when necessary  - Involve family in performance of ADLs  - Assess for home care needs following discharge   - Consider OT consult to assist with ADL evaluation and planning for discharge  - Provide patient education as appropriate  Outcome: Progressing  Goal: Maintains/Returns to pre admission functional level  Description: INTERVENTIONS:  - Perform AM-PAC 6 Click Basic Mobility/ Daily Activity assessment daily.  - Set and communicate daily mobility goal to care team and patient/family/caregiver.   - Collaborate with rehabilitation services on mobility goals if consulted  - Perform Range of Motion  times a day.  - Reposition patient every  hours.  - Dangle patient  times a day  - Stand patient  times a day  - Ambulate patient  times a day  - Out of bed to chair  times a day   - Out of bed for meals times a day  - Out of bed for toileting  - Record patient progress and toleration of activity level   Outcome: Progressing     Problem: DISCHARGE PLANNING  Goal: Discharge to home or other facility with appropriate resources  Description: INTERVENTIONS:  - Identify barriers to discharge w/patient and caregiver  - Arrange for  needed discharge resources and transportation as appropriate  - Identify discharge learning needs (meds, wound care, etc.)  - Arrange for interpretive services to assist at discharge as needed  - Refer to Case Management Department for coordinating discharge planning if the patient needs post-hospital services based on physician/advanced practitioner order or complex needs related to functional status, cognitive ability, or social support system  Outcome: Progressing

## 2024-02-04 NOTE — DISCHARGE SUMMARY
DISCHARGE SUMMARY  [facility]       Niyah Hall   48 y.o. female  MRN: [id number]  Room/Bed: /-01     [facility] [place] 4TH FLOOR MED SURG   Encounter: [id number]    Principal Problem:    Acute cholecystitis  Active Problems:    Abnormal CT of the abdomen    BMI 38.0-38.9,adult    Splenic artery aneurysm (HCC)      * Acute cholecystitis  Assessment & Plan  Patient presented with right upper quadrant pain finding and imaging suggesting of acute cholecystitis.  Treated with IV fluid, pain management, NPO.  S/p cholecystectomy by general surgery.  Tolerating procedure well.  Denies any complaints after that.  Pain is very minimal and controlled with current medication.    GI consulted recommended outpatient EGD for persistent reflux symptoms  Continue Pepcid as needed  Follow-up with general surgery in [place]  Pain management with Tylenol  Prescription for short course of oxycodone given    Splenic artery aneurysm (HCC)  Assessment & Plan  Noted to have 2.5 cm splenic artery aneurysm likely chronic with calcification found incidentally on imaging.  Will need vascular surgery referral as soon as possible outpatient.  Was advised to follow-up with PCP as she is a resident of [place] and will require referral prior to be seen by vascular surgery over there.    Continue aspirin, statin    BMI 38.0-38.9,adult  Assessment & Plan  Exercise and diet modification were highly encouraged.    Abnormal CT of the abdomen  Assessment & Plan  Incidental finding of a simple cyst in left retroperitoneal region, indeterminate  Recommendation to follow-up in 3 months for CT abdomen pelvis for monitoring  Due to being partially thrombosed did reach out to on-call vascular surgery who reports anticoagulation not needed at this time as this could be a normal finding, however, due to patient being within childbearing age and aneurysm is 2.5 cm or greater her  aneurysm does warrant repair most likely electively  Reviewed finding with patient and recommendation to follow-up outpatient after discharge with vascular surgery  Vascular surgery also reports at this time patient does not need to be transferred to [place]        DETAILS OF HOSPITAL COURSE     Reason for Admission: Right upper quadrant pain, acute cholecystitis, splenic artery aneurysm    Patient admitted with right upper quadrant pain and stranding suggestive of acute cholecystitis.  S/p cholecystectomy by general surgery.  GI was also consulted due to epigastric pain and suspicion for persistent cough and rule out a PUD or active ulcer.  GI recommended for EGD outpatient with Pepcid as needed.  Was advised to follow-up with general surgery outpatient as well.  Given incidental finding of 2.5 cm splenic artery aneurysm with calcification likely chronic etiology vascular surgery was approached initially due to concern of abdominal pain there was concern for transfer to [place] for further vascular surgery however patient became pain-free after cholecystectomy so was recommended to follow-up with outpatient vascular surgery as soon as possible.  Patient is a resident of [place] so was advised to follow-up with PCP, vascular surgery, surgery at that place.    Rest of the details as per assessment and plan.    DISCHARGE INFORMATION     PCP at Discharge: No primary care provider on file.      Admitting Provider: Harrison Gregorio DO  Admission Date: 2/2/2024    Discharge Provider: Leah Shay MD  Discharge Date:     Discharge Disposition: Final discharge disposition not confirmed  Discharge Condition: stable    Test Results Pending at Discharge: None    Outpatient Tests Requested: None    Discharge Diagnoses:  Principal Problem:    Acute cholecystitis  Active Problems:    Abnormal CT of the abdomen    BMI 38.0-38.9,adult    Splenic artery aneurysm (HCC)  Resolved Problems:    * No resolved hospital  "problems. *      Consultations During Hospital Stay:  GI, general surgery, vascular surgery    Diagnostic & Therapeutic Procedures Performed:  US right upper quadrant    Result Date: 2/3/2024  Impression: Sonographic findings detailed above suspicious for acute cholecystitis. Correlate with clinical parameters. The study was marked in EPIC for immediate notification. Tiger text communication sent to and acknowledged by gastroenterology consult Noemy Purvis and medical resident Charlie Workstation performed: [id number]     CT abdomen pelvis with contrast    Result Date: 2/3/2024  Impression: Cholelithiasis. No CT evidence of acute cholecystitis. Chronic appearing peripherally calcified splenic artery aneurysm measuring up to 2.5 cm. Nonemergent vascular/interventional radiologic consultation is advised. Simple cyst in the left retroperitoneal region measuring up to 2.9 cm, indeterminate. Differential includes mesenteric cyst. Short-term follow-up 3-month contrast-enhanced CT abdomen pelvis is recommended to ensure stability. This study was marked for \"Immediate\" notification in EPIC. Workstation performed: [id number]       Code Status: Level 1 - Full Code  Advance Directive & Living Will: <no information>  Power of :    POLST:      Medications:  Current Discharge Medication List        STOP taking these medications       omeprazole (PriLOSEC OTC) 20 MG tablet Comments:   Reason for Stopping:             Current Discharge Medication List        START taking these medications    Details   aspirin (ECOTRIN LOW STRENGTH) 81 mg EC tablet Take 1 tablet (81 mg total) by mouth daily  Qty: 30 tablet, Refills: 1    Associated Diagnoses: Abnormal CT of the abdomen      atorvastatin (LIPITOR) 40 mg tablet Take 1 tablet (40 mg total) by mouth daily  Qty: 30 tablet, Refills: 1    Associated Diagnoses: Abnormal CT of the abdomen      oxyCODONE (ROXICODONE) 5 immediate release tablet Take 1 tablet (5 mg total) by mouth every " "4 (four) hours as needed for severe pain for up to 10 doses Max Daily Amount: 30 mg  Qty: 10 tablet, Refills: 0    Associated Diagnoses: Cholelithiasis without cholecystitis           Current Discharge Medication List          Allergies:  No Known Allergies    Discharge Day Visit / Exam:     Subjective: Patient was seen and examined by me.  Communicated clearly.  No particular overnight event reported.  Hemodynamically stable and afebrile.  Patient feels better overall.    Vitals: Blood Pressure: 121/78 (02/04/24 0729)  Pulse: 72 (02/04/24 0729)  Temperature: 98.4 °F (36.9 °C) (02/04/24 0729)  Temp Source: Oral (02/04/24 0413)  Respirations: 17 (02/04/24 0729)  Height: 5' 4\" (162.6 cm) (02/03/24 0948)  Weight - Scale: 102 kg (223 lb 15.8 oz) (02/03/24 0948)  SpO2: 93 % (02/04/24 0729)  Exam:   Physical Exam  Vitals reviewed.   Constitutional:       General: She is not in acute distress.     Appearance: She is well-developed.   HENT:      Head: Normocephalic and atraumatic.   Eyes:      General: No scleral icterus.        Right eye: No discharge.         Left eye: No discharge.   Cardiovascular:      Rate and Rhythm: Normal rate and regular rhythm.      Pulses: Normal pulses.      Heart sounds: Normal heart sounds.   Pulmonary:      Effort: Pulmonary effort is normal. No respiratory distress.      Breath sounds: Normal breath sounds. No wheezing or rales.   Chest:      Chest wall: No tenderness.   Abdominal:      General: Bowel sounds are normal. There is no distension.      Palpations: Abdomen is soft.      Tenderness: There is no abdominal tenderness.   Musculoskeletal:         General: No swelling or tenderness. Normal range of motion.      Cervical back: Normal range of motion.      Right lower leg: No edema.      Left lower leg: No edema.   Skin:     General: Skin is warm.      Coloration: Skin is not pale.   Neurological:      General: No focal deficit present.      Mental Status: She is alert and oriented to " "person, place, and time. Mental status is at baseline.      Cranial Nerves: No cranial nerve deficit.      Sensory: No sensory deficit.      Motor: No weakness.   Psychiatric:         Mood and Affect: Mood normal.         Behavior: Behavior normal.         Discussion with Family: None    Discharge instructions/Information to patient and family:   See after visit summary for information provided to patient and family.      Provisions for Follow-Up Care:  See after visit summary for information related to follow-up care and any pertinent home health orders.       Discharge Medications:  See after visit summary for reconciled discharge medications provided to patient and family.    Discharge Statement:   I spent 35 minutes minutes discharging the patient. This time was spent on the day of discharge. I had direct contact with the patient on the day of discharge. Additional documentation is required if more than 30 minutes were spent on discharge.    Portions of the record may have been created with voice recognition software.  Occasional wrong word or \"sound a like\" substitutions may have occurred due to the inherent limitations of voice recognition software.  Read the chart carefully and recognize, using context, where substitutions have occurred.  "

## 2024-02-08 PROCEDURE — 88304 TISSUE EXAM BY PATHOLOGIST: CPT | Performed by: PATHOLOGY

## 2024-02-19 ENCOUNTER — OFFICE VISIT (OUTPATIENT)
Dept: SURGERY | Facility: CLINIC | Age: 49
End: 2024-02-19

## 2024-02-19 VITALS
DIASTOLIC BLOOD PRESSURE: 74 MMHG | HEART RATE: 69 BPM | TEMPERATURE: 97.5 F | WEIGHT: 219 LBS | BODY MASS INDEX: 37.39 KG/M2 | HEIGHT: 64 IN | SYSTOLIC BLOOD PRESSURE: 122 MMHG | RESPIRATION RATE: 18 BRPM | OXYGEN SATURATION: 99 %

## 2024-02-19 DIAGNOSIS — Z90.49 S/P LAPAROSCOPIC CHOLECYSTECTOMY: Primary | ICD-10-CM

## 2024-02-19 PROCEDURE — 99024 POSTOP FOLLOW-UP VISIT: CPT | Performed by: SURGERY

## 2024-02-19 NOTE — PROGRESS NOTES
"Assessment/Plan:    Pathology Results:  Final Diagnosis   A. Gallbladder, Cholecystectomy:  - Acute on chronic cholecystitis with cholelithiasis.      1. S/P laparoscopic cholecystectomy        Recovering well after laparoscopic cholecystectomy. No signs or symptoms of infection. Pathology was reviewed and there were no unusual or unexpected findings and no malignancy. Resume regular activity. Followup if needed.        Subjective:      Patient ID: Niyah Hall is a 48 y.o. female.    Triage Notes:    Mrs Hall is following up about 2 weeks after laparoscopic cholecystectomy for acute cholecystitis. Reports feeling well. Pain has improved. Is eating and taking it slow reintroducing foods. No diarrhea or constipation. No fevers/chills/drainage/redness.          The following portions of the patient's history were reviewed and updated as appropriate: allergies, current medications, past family history, past medical history, past social history, past surgical history and problem list.    Review of Systems      Objective:      /74   Pulse 69   Temp 97.5 °F (36.4 °C) (Temporal)   Resp 18   Ht 5' 4\" (1.626 m)   Wt 99.3 kg (219 lb)   LMP 01/26/2024 (Exact Date)   SpO2 99%   BMI 37.59 kg/m²     Below is the patient's most recent value for Albumin, ALT, AST, BUN, Calcium, Chloride, Cholesterol, CO2, Creatinine, GFR, Glucose, HDL, Hematocrit, Hemoglobin, Hemoglobin A1C, LDL, Magnesium, Phosphorus, Platelets, Potassium, PSA, Sodium, Triglycerides, and WBC.   Lab Results   Component Value Date    ALT 27 02/04/2024    AST 28 02/04/2024    BUN 5 02/04/2024    CALCIUM 8.6 02/04/2024     (H) 02/04/2024    CO2 25 02/04/2024    CREATININE 0.55 (L) 02/04/2024    HCT 31.6 (L) 02/04/2024    HGB 9.7 (L) 02/04/2024     02/04/2024    K 4.1 02/04/2024    WBC 12.04 (H) 02/04/2024     Note: for a comprehensive list of the patient's lab results, access the Results Review activity.     Physical Exam  Vitals " and nursing note reviewed.   Constitutional:       General: She is not in acute distress.     Appearance: She is well-developed. She is not diaphoretic.   HENT:      Head: Normocephalic and atraumatic.   Eyes:      Pupils: Pupils are equal, round, and reactive to light.   Cardiovascular:      Rate and Rhythm: Normal rate and regular rhythm.   Pulmonary:      Effort: Pulmonary effort is normal. No respiratory distress.   Abdominal:      General: There is no distension.      Palpations: Abdomen is soft. There is no mass.      Tenderness: There is no guarding or rebound.      Hernia: No hernia is present.      Comments: The laparoscopic port sites are clean and dry with no erythema or drainage.   Musculoskeletal:         General: Normal range of motion.      Cervical back: Normal range of motion and neck supple.   Skin:     General: Skin is warm and dry.   Neurological:      Mental Status: She is alert and oriented to person, place, and time.   Psychiatric:         Mood and Affect: Mood normal.         Behavior: Behavior normal.         Thought Content: Thought content normal.         Judgment: Judgment normal.           Procedures

## 2024-02-19 NOTE — OP NOTE
OPERATIVE REPORT  PATIENT NAME: Niyah Hall    :  1975  MRN: [id number]  Pt Location: [facility] OR ROOM 04    SURGERY DATE: 2/3/2024    Surgeons and Role:     * Barbara Rosario MD - Primary     * Adriana Quiroz PA-C - Assisting    Preop Diagnosis:  Acute cholecystitis [K81.0]    Post-Op Diagnosis Codes:     * Acute cholecystitis [K81.0]    Procedure(s):  CHOLECYSTECTOMY LAPAROSCOPIC    Specimen(s):  ID Type Source Tests Collected by Time Destination   1 : Gallbladder plus contents Tissue Gallbladder TISSUE EXAM Barbara Rosario MD 2/3/2024  3:09 PM        Estimated Blood Loss:   Minimal    Drains:  [REMOVED] NG/OG/Enteral Tube Orogastric 18 Fr Center mouth (Removed)   Number of days: 0       Anesthesia Type:   General    Operative Indications:  Acute cholecystitis [K81.0]      Operative Findings:  Cholecystitis, cholelithiasis    Complications:   None    Procedure and Technique:  The patient was brought to the operating room and placed in supine position.  The patient  was sedated and intubated and preoperative antibiotics were given.  The abdomen was prepped and draped in the usual sterile fashion.  A time-out was carried out and initiated by myself and confirmed the correct patient, procedure, antibiotics any additional concerns.  In the infra-umbilical position a combination of 1% lidocaine and 0.25% Marcaine was instilled.  An 11 blade was used to make a 10 mm incision.  This was carried down to the fascia and a Kocher was used to grasp the umbilical stalk.  The fascia was incised in the midline using the 11 blade.  A 0 Vicryl on a UR6 needle was used to place a Baez port and the abdomen was insufflated.  Additional 5 mm ports were placed in the subxiphoid and right subcostal positions.  The gallbladder was enlarged and distended and required aspiration to manipulate and grasp. The gallbladder was grasped with a locking grasper and retracted cephalad.   The gallbladder was grasped and the triangle  of calot was dissected using a maryland.  .Using a maryland - the critical view was obtained.  Clips were used to clip and then ligate the duct and artery.  The gallbladder was then taken off of the gallbladder fossa using the hook electrocautery.  The gallbladder was placed in an Endo-Catch bag and removed from the abdomen.  Under direct vision the 5 mm ports were removed and the abdomen was desufflated.  The Louis port was then also removed and the Vicryl sutures were tied in the midline to close the umbilical port site.  A 4 0 Monocryl was used to close the skin of all of the ports.  Steris, guaze and tegaderm was placed over the incisions.  The patient was then awakened and transported over to the stretcher and to PACU.    I was present for the entire procedure., A qualified resident physician was not available., and A physician assistant was required during the procedure for retraction, tissue handling, dissection and suturing.    Patient Disposition:  PACU  and extubated and stable        SIGNATURE: Barbara Rosario MD  DATE: February 19, 2024  TIME: 4:25 PM

## (undated) DEVICE — TROCAR: Brand: KII® SLEEVE

## (undated) DEVICE — ELECTRODE LAP L WIRE E-Z CLEAN 33CM -0100

## (undated) DEVICE — LIGAMAX 5 MM ENDOSCOPIC MULTIPLE CLIP APPLIER: Brand: LIGAMAX

## (undated) DEVICE — ALLENTOWN LAP CHOLE APP PACK: Brand: CARDINAL HEALTH

## (undated) DEVICE — TISSUE RETRIEVAL SYSTEM: Brand: INZII RETRIEVAL SYSTEM

## (undated) DEVICE — PAD GROUNDING DUAL ADULT

## (undated) DEVICE — TROCARS: Brand: KII® BALLOON BLUNT TIP SYSTEM

## (undated) DEVICE — TROCAR: Brand: KII FIOS FIRST ENTRY

## (undated) DEVICE — INTENDED FOR TISSUE SEPARATION, AND OTHER PROCEDURES THAT REQUIRE A SHARP SURGICAL BLADE TO PUNCTURE OR CUT.: Brand: BARD-PARKER SAFETY BLADES SIZE 11, STERILE

## (undated) DEVICE — METZENBAUM ADTEC SINGLE USE DISSECTING SCISSORS, SHAFT ONLY, MONOPOLAR, CURVED TO LEFT, WORKING LENGTH: 12 1/4", (310 MM), DIAM. 5 MM, INSULATED, DOUBLE ACTION, STERILE, DISPOSABLE, PACKAGE OF 10 PIECES: Brand: AESCULAP

## (undated) DEVICE — NEPTUNE E-SEP SMOKE EVACUATION PENCIL, COATED, 70MM BLADE, PUSH BUTTON SWITCH: Brand: NEPTUNE E-SEP

## (undated) DEVICE — TUBING SMOKE EVAC W/FILTRATION DEVICE PLUMEPORT ACTIV

## (undated) DEVICE — 3M™ TEGADERM™ TRANSPARENT FILM DRESSING FRAME STYLE, 1624W, 2-3/8 IN X 2-3/4 IN (6 CM X 7 CM), 100/CT 4CT/CASE: Brand: 3M™ TEGADERM™

## (undated) DEVICE — SUT VICRYL 0 UR-6 27 IN J603H

## (undated) DEVICE — IRRIG ENDO FLO TUBING

## (undated) DEVICE — SCD SEQUENTIAL COMPRESSION COMFORT SLEEVE MEDIUM KNEE LENGTH: Brand: KENDALL SCD

## (undated) DEVICE — [HIGH FLOW INSUFFLATOR,  DO NOT USE IF PACKAGE IS DAMAGED,  KEEP DRY,  KEEP AWAY FROM SUNLIGHT,  PROTECT FROM HEAT AND RADIOACTIVE SOURCES.]: Brand: PNEUMOSURE

## (undated) DEVICE — CHLORAPREP HI-LITE 26ML ORANGE

## (undated) DEVICE — 4-PORT MANIFOLD: Brand: NEPTUNE 2

## (undated) DEVICE — 3M™ STERI-STRIP™ REINFORCED ADHESIVE SKIN CLOSURES, R1546, 1/4 IN X 4 IN (6 MM X 100 MM), 10 STRIPS/ENVELOPE: Brand: 3M™ STERI-STRIP™

## (undated) DEVICE — 5 MM CURVED DISSECTORS WITH MONOPOLAR CAUTERY: Brand: ENDOPATH

## (undated) DEVICE — GLOVE SRG BIOGEL 7

## (undated) DEVICE — GAUZE SPONGES,8 PLY: Brand: CURITY

## (undated) DEVICE — DRAPE EQUIPMENT RF WAND

## (undated) DEVICE — TOWEL SURG XR DETECT GREEN STRL RFD

## (undated) DEVICE — SUT MONOCRYL 4-0 SH 27 IN Y315H